# Patient Record
Sex: FEMALE | Race: WHITE | Employment: OTHER | URBAN - METROPOLITAN AREA
[De-identification: names, ages, dates, MRNs, and addresses within clinical notes are randomized per-mention and may not be internally consistent; named-entity substitution may affect disease eponyms.]

---

## 2017-05-05 ENCOUNTER — LAB ENCOUNTER (OUTPATIENT)
Dept: LAB | Age: 77
End: 2017-05-05
Attending: OPHTHALMOLOGY
Payer: MEDICARE

## 2017-05-05 DIAGNOSIS — H47.011 ANTERIOR ISCHEMIC OPTIC NEUROPATHY OF RIGHT EYE: Primary | ICD-10-CM

## 2017-05-05 PROCEDURE — 36415 COLL VENOUS BLD VENIPUNCTURE: CPT

## 2017-05-05 PROCEDURE — 80061 LIPID PANEL: CPT

## 2017-05-05 PROCEDURE — 85025 COMPLETE CBC W/AUTO DIFF WBC: CPT

## 2017-05-05 PROCEDURE — 85652 RBC SED RATE AUTOMATED: CPT

## 2017-05-05 PROCEDURE — 86140 C-REACTIVE PROTEIN: CPT

## 2017-05-05 PROCEDURE — 83036 HEMOGLOBIN GLYCOSYLATED A1C: CPT

## 2017-05-09 ENCOUNTER — HOSPITAL ENCOUNTER (OUTPATIENT)
Dept: MRI IMAGING | Age: 77
Discharge: HOME | End: 2017-05-09
Attending: OPHTHALMOLOGY
Payer: MEDICARE

## 2017-05-09 DIAGNOSIS — H47.291 TEMPORAL PALLOR OF OPTIC DISC, RIGHT: ICD-10-CM

## 2017-05-09 DIAGNOSIS — H40.1112 PRIMARY OPEN ANGLE GLAUCOMA OF RIGHT EYE, MODERATE STAGE: ICD-10-CM

## 2017-05-09 PROCEDURE — 70543 MRI ORBT/FAC/NCK W/O &W/DYE: CPT | Performed by: OPHTHALMOLOGY

## 2017-05-09 PROCEDURE — 70553 MRI BRAIN STEM W/O & W/DYE: CPT | Performed by: OPHTHALMOLOGY

## 2017-05-09 PROCEDURE — A9575 INJ GADOTERATE MEGLUMI 0.1ML: HCPCS | Performed by: RADIOLOGY

## 2018-06-21 ENCOUNTER — LAB ENCOUNTER (OUTPATIENT)
Dept: LAB | Age: 78
End: 2018-06-21
Attending: INTERNAL MEDICINE
Payer: MEDICARE

## 2018-06-21 DIAGNOSIS — E78.00 PURE HYPERCHOLESTEROLEMIA: ICD-10-CM

## 2018-06-21 DIAGNOSIS — I10 ESSENTIAL HYPERTENSION, BENIGN: ICD-10-CM

## 2018-06-21 PROCEDURE — 80061 LIPID PANEL: CPT

## 2018-06-21 PROCEDURE — 80053 COMPREHEN METABOLIC PANEL: CPT

## 2018-06-21 PROCEDURE — 36415 COLL VENOUS BLD VENIPUNCTURE: CPT

## 2018-06-22 NOTE — PROGRESS NOTES
Please call  Her labs are normal except her sodium is low  This is most likely due to her diuretic  Recommend increasing water intake to at least 64oz a day  Will recheck bmp in 1 month

## 2018-06-24 NOTE — PROGRESS NOTES
Please clarify the order to increase fluids with a low Na of 130, wouldn't you want to restrict fluids?   Thank you

## 2018-07-08 NOTE — PROGRESS NOTES
Tried calling pt  No answer, phone rings multiple times and then goes to busy signal  Letter sent to [facility] pool for printing and mailing

## 2018-08-21 ENCOUNTER — LAB ENCOUNTER (OUTPATIENT)
Dept: LAB | Age: 78
End: 2018-08-21
Attending: INTERNAL MEDICINE
Payer: MEDICARE

## 2018-08-21 DIAGNOSIS — E87.1 HYPONATREMIA: ICD-10-CM

## 2018-08-21 LAB
ANION GAP SERPL CALC-SCNC: 5 MMOL/L (ref 0–18)
BUN BLD-MCNC: 11 MG/DL (ref 8–20)
BUN/CREAT SERPL: 13.4 (ref 10–20)
CALCIUM BLD-MCNC: 9.5 MG/DL (ref 8.3–10.3)
CHLORIDE SERPL-SCNC: 99 MMOL/L (ref 101–111)
CO2 SERPL-SCNC: 31 MMOL/L (ref 22–32)
CREAT BLD-MCNC: 0.82 MG/DL (ref 0.55–1.02)
GLUCOSE BLD-MCNC: 94 MG/DL (ref 70–99)
OSMOLALITY SERPL CALC.SUM OF ELEC: 279 MOSM/KG (ref 275–295)
POTASSIUM SERPL-SCNC: 4.1 MMOL/L (ref 3.6–5.1)
SODIUM SERPL-SCNC: 135 MMOL/L (ref 136–144)

## 2018-08-21 PROCEDURE — 36415 COLL VENOUS BLD VENIPUNCTURE: CPT

## 2018-08-21 PROCEDURE — 80048 BASIC METABOLIC PNL TOTAL CA: CPT

## 2019-09-05 ENCOUNTER — HOSPITAL ENCOUNTER (OUTPATIENT)
Dept: BONE DENSITY | Age: 79
Discharge: HOME | End: 2019-09-05
Attending: INTERNAL MEDICINE
Payer: MEDICARE

## 2019-09-05 ENCOUNTER — LAB ENCOUNTER (OUTPATIENT)
Dept: LAB | Age: 79
End: 2019-09-05
Attending: INTERNAL MEDICINE
Payer: MEDICARE

## 2019-09-05 DIAGNOSIS — I10 ESSENTIAL HYPERTENSION, BENIGN: ICD-10-CM

## 2019-09-05 DIAGNOSIS — Z78.9 VEGETARIAN: ICD-10-CM

## 2019-09-05 DIAGNOSIS — E78.00 PURE HYPERCHOLESTEROLEMIA: ICD-10-CM

## 2019-09-05 DIAGNOSIS — Z78.0 MENOPAUSE: ICD-10-CM

## 2019-09-05 LAB
ALBUMIN SERPL-MCNC: 4 G/DL (ref 3.4–5)
ALBUMIN/GLOB SERPL: 1.1 {RATIO} (ref 1–2)
ALP LIVER SERPL-CCNC: 99 U/L (ref 55–142)
ALT SERPL-CCNC: 25 U/L (ref 13–56)
ANION GAP SERPL CALC-SCNC: 8 MMOL/L (ref 0–18)
AST SERPL-CCNC: 20 U/L (ref 15–37)
BILIRUB SERPL-MCNC: 0.6 MG/DL (ref 0.1–2)
BUN BLD-MCNC: 11 MG/DL (ref 7–18)
BUN/CREAT SERPL: 13.9 (ref 10–20)
CALCIUM BLD-MCNC: 8.9 MG/DL (ref 8.5–10.1)
CHLORIDE SERPL-SCNC: 100 MMOL/L (ref 98–112)
CO2 SERPL-SCNC: 27 MMOL/L (ref 21–32)
CREAT BLD-MCNC: 0.79 MG/DL (ref 0.55–1.02)
FOLATE SERPL-MCNC: 20 NG/ML (ref 8.7–?)
GLOBULIN PLAS-MCNC: 3.7 G/DL (ref 2.8–4.4)
GLUCOSE BLD-MCNC: 103 MG/DL (ref 70–99)
M PROTEIN MFR SERPL ELPH: 7.7 G/DL (ref 6.4–8.2)
OSMOLALITY SERPL CALC.SUM OF ELEC: 280 MOSM/KG (ref 275–295)
POTASSIUM SERPL-SCNC: 4.7 MMOL/L (ref 3.5–5.1)
SODIUM SERPL-SCNC: 135 MMOL/L (ref 136–145)
VIT B12 SERPL-MCNC: 468 PG/ML (ref 193–986)

## 2019-09-05 PROCEDURE — 82746 ASSAY OF FOLIC ACID SERUM: CPT

## 2019-09-05 PROCEDURE — 80053 COMPREHEN METABOLIC PANEL: CPT

## 2019-09-05 PROCEDURE — 77080 DXA BONE DENSITY AXIAL: CPT | Performed by: INTERNAL MEDICINE

## 2019-09-05 PROCEDURE — 82607 VITAMIN B-12: CPT

## 2020-11-09 ENCOUNTER — NURSE ONLY (OUTPATIENT)
Dept: FAMILY MEDICINE CLINIC | Facility: CLINIC | Age: 80
End: 2020-11-09
Payer: MEDICARE

## 2020-11-09 DIAGNOSIS — E78.00 PURE HYPERCHOLESTEROLEMIA: ICD-10-CM

## 2020-11-09 DIAGNOSIS — I10 ESSENTIAL HYPERTENSION, BENIGN: ICD-10-CM

## 2020-11-09 PROCEDURE — 80053 COMPREHEN METABOLIC PANEL: CPT | Performed by: INTERNAL MEDICINE

## 2020-11-09 PROCEDURE — 80061 LIPID PANEL: CPT | Performed by: INTERNAL MEDICINE

## 2020-11-09 NOTE — PROGRESS NOTES
Sherry Rosa presents today for nurse visit. 1 light green drawn from right  ac area with 1 attempt with straight needle. Patient tolerated well. Left office in stable condition.

## 2021-09-14 ENCOUNTER — LAB ENCOUNTER (OUTPATIENT)
Dept: LAB | Age: 81
End: 2021-09-14
Attending: INTERNAL MEDICINE
Payer: MEDICARE

## 2021-09-14 DIAGNOSIS — E87.1 LOW SODIUM LEVELS: ICD-10-CM

## 2021-09-14 LAB
ANION GAP SERPL CALC-SCNC: 6 MMOL/L (ref 0–18)
BUN BLD-MCNC: 12 MG/DL (ref 7–18)
CALCIUM BLD-MCNC: 8.9 MG/DL (ref 8.5–10.1)
CHLORIDE SERPL-SCNC: 97 MMOL/L (ref 98–112)
CO2 SERPL-SCNC: 28 MMOL/L (ref 21–32)
CREAT BLD-MCNC: 0.68 MG/DL
GLUCOSE BLD-MCNC: 90 MG/DL (ref 70–99)
OSMOLALITY SERPL CALC.SUM OF ELEC: 271 MOSM/KG (ref 275–295)
PATIENT FASTING Y/N/NP: NO
POTASSIUM SERPL-SCNC: 3.7 MMOL/L (ref 3.5–5.1)
SODIUM SERPL-SCNC: 131 MMOL/L (ref 136–145)

## 2021-09-14 PROCEDURE — 80048 BASIC METABOLIC PNL TOTAL CA: CPT

## 2021-09-14 PROCEDURE — 36415 COLL VENOUS BLD VENIPUNCTURE: CPT

## 2021-09-21 NOTE — PROGRESS NOTES
[employer]          [phone]  [place] #[facility]   Letter sent 9/21/2021  Via mail with MD comment below. Lab ordered.

## 2021-09-29 ENCOUNTER — LAB ENCOUNTER (OUTPATIENT)
Dept: LAB | Age: 81
End: 2021-09-29
Attending: INTERNAL MEDICINE
Payer: MEDICARE

## 2021-09-29 DIAGNOSIS — E87.1 HYPONATREMIA: ICD-10-CM

## 2021-09-29 PROCEDURE — 36415 COLL VENOUS BLD VENIPUNCTURE: CPT

## 2021-09-29 PROCEDURE — 80048 BASIC METABOLIC PNL TOTAL CA: CPT

## 2023-03-27 ENCOUNTER — LAB ENCOUNTER (OUTPATIENT)
Dept: LAB | Age: 83
End: 2023-03-27
Attending: INTERNAL MEDICINE
Payer: MEDICARE

## 2023-03-27 DIAGNOSIS — E87.1 HYPONATREMIA: Primary | ICD-10-CM

## 2023-03-27 LAB
ANION GAP SERPL CALC-SCNC: 4 MMOL/L (ref 0–18)
BUN BLD-MCNC: 12 MG/DL (ref 7–18)
CALCIUM BLD-MCNC: 9 MG/DL (ref 8.5–10.1)
CHLORIDE SERPL-SCNC: 99 MMOL/L (ref 98–112)
CO2 SERPL-SCNC: 29 MMOL/L (ref 21–32)
CREAT BLD-MCNC: 0.83 MG/DL
FASTING STATUS PATIENT QL REPORTED: YES
GFR SERPLBLD BASED ON 1.73 SQ M-ARVRAT: 69 ML/MIN/1.73M2 (ref 60–?)
GLUCOSE BLD-MCNC: 109 MG/DL (ref 70–99)
OSMOLALITY SERPL CALC.SUM OF ELEC: 274 MOSM/KG (ref 275–295)
POTASSIUM SERPL-SCNC: 3.6 MMOL/L (ref 3.5–5.1)
SODIUM SERPL-SCNC: 132 MMOL/L (ref 136–145)

## 2023-03-27 PROCEDURE — 36415 COLL VENOUS BLD VENIPUNCTURE: CPT

## 2023-03-27 PROCEDURE — 80048 BASIC METABOLIC PNL TOTAL CA: CPT

## 2023-05-09 ENCOUNTER — LAB ENCOUNTER (OUTPATIENT)
Dept: LAB | Age: 83
End: 2023-05-09
Attending: INTERNAL MEDICINE
Payer: MEDICARE

## 2023-05-09 DIAGNOSIS — E87.1 HYPONATREMIA: Primary | ICD-10-CM

## 2023-05-09 LAB
ANION GAP SERPL CALC-SCNC: 2 MMOL/L (ref 0–18)
BUN BLD-MCNC: 14 MG/DL (ref 7–18)
CALCIUM BLD-MCNC: 9.1 MG/DL (ref 8.5–10.1)
CHLORIDE SERPL-SCNC: 98 MMOL/L (ref 98–112)
CO2 SERPL-SCNC: 29 MMOL/L (ref 21–32)
CREAT BLD-MCNC: 0.83 MG/DL
FASTING STATUS PATIENT QL REPORTED: YES
GFR SERPLBLD BASED ON 1.73 SQ M-ARVRAT: 69 ML/MIN/1.73M2 (ref 60–?)
GLUCOSE BLD-MCNC: 99 MG/DL (ref 70–99)
OSMOLALITY SERPL CALC.SUM OF ELEC: 269 MOSM/KG (ref 275–295)
POTASSIUM SERPL-SCNC: 4 MMOL/L (ref 3.5–5.1)
SODIUM SERPL-SCNC: 129 MMOL/L (ref 136–145)

## 2023-05-09 PROCEDURE — 36415 COLL VENOUS BLD VENIPUNCTURE: CPT

## 2023-05-09 PROCEDURE — 80048 BASIC METABOLIC PNL TOTAL CA: CPT

## 2023-07-20 ENCOUNTER — LAB ENCOUNTER (OUTPATIENT)
Dept: LAB | Age: 83
End: 2023-07-20
Attending: INTERNAL MEDICINE
Payer: MEDICARE

## 2023-07-20 DIAGNOSIS — E87.1 LOW SODIUM LEVELS: Primary | ICD-10-CM

## 2023-07-20 LAB
ANION GAP SERPL CALC-SCNC: 7 MMOL/L (ref 0–18)
BUN BLD-MCNC: 20 MG/DL (ref 7–18)
CALCIUM BLD-MCNC: 9 MG/DL (ref 8.5–10.1)
CHLORIDE SERPL-SCNC: 99 MMOL/L (ref 98–112)
CO2 SERPL-SCNC: 27 MMOL/L (ref 21–32)
CREAT BLD-MCNC: 0.79 MG/DL
FASTING STATUS PATIENT QL REPORTED: NO
GFR SERPLBLD BASED ON 1.73 SQ M-ARVRAT: 74 ML/MIN/1.73M2 (ref 60–?)
GLUCOSE BLD-MCNC: 93 MG/DL (ref 70–99)
OSMOLALITY SERPL CALC.SUM OF ELEC: 278 MOSM/KG (ref 275–295)
POTASSIUM SERPL-SCNC: 4 MMOL/L (ref 3.5–5.1)
SODIUM SERPL-SCNC: 133 MMOL/L (ref 136–145)

## 2023-07-20 PROCEDURE — 36415 COLL VENOUS BLD VENIPUNCTURE: CPT

## 2023-07-20 PROCEDURE — 80048 BASIC METABOLIC PNL TOTAL CA: CPT

## 2024-12-29 ENCOUNTER — HOSPITAL ENCOUNTER (OUTPATIENT)
Age: 84
Discharge: HOME | End: 2024-12-29
Payer: MEDICARE

## 2024-12-29 VITALS
OXYGEN SATURATION: 98 % | BODY MASS INDEX: 25.2 KG/M2 | DIASTOLIC BLOOD PRESSURE: 73 MMHG | HEIGHT: 59 IN | WEIGHT: 125 LBS | SYSTOLIC BLOOD PRESSURE: 174 MMHG | TEMPERATURE: 98 F | HEART RATE: 80 BPM | RESPIRATION RATE: 16 BRPM

## 2024-12-29 DIAGNOSIS — J02.9 VIRAL PHARYNGITIS: Primary | ICD-10-CM

## 2024-12-29 LAB — S PYO AG THROAT QL: NEGATIVE

## 2024-12-29 PROCEDURE — 87880 STREP A ASSAY W/OPTIC: CPT | Performed by: NURSE PRACTITIONER

## 2024-12-29 PROCEDURE — 99203 OFFICE O/P NEW LOW 30 MIN: CPT | Performed by: NURSE PRACTITIONER

## 2024-12-29 NOTE — DISCHARGE INSTRUCTIONS
Please purchase over-the-counter Mucinex Insta soothe throat lozenges.  You could also take Tylenol for pain.  Warm salt water gargles and spitting.  Stay home when you feel unwell.  PCP follow-up as needed.

## 2024-12-29 NOTE — ED PROVIDER NOTES
Patient Seen in: [facility]      History     Chief Complaint   Patient presents with    Sore Throat     Stated Complaint: sore throat    Subjective:   This is an 84-year-old with a history of hyperlipidemia and hypertension.  Presents to immediate care for localized sore throat.  Symptoms going for 3 days.  She was exposed to her grandchildren which are sick with viral illnesses.  No voice change or stridor.  No fevers.  No difficulty breathing or wheezing.  No chest pain or shortness of breath.  No treatment attempted prior to arrival.    The history is provided by the patient.             Objective:     Past Medical History:    HYPERLIPIDEMIA    HYPERTENSION              Past Surgical History:   Procedure Laterality Date    Hysterectomy      complete/excessive bleeding                Social History     Socioeconomic History    Marital status:     Number of children: 5   Occupational History    Occupation: retired     Comment: housewife   Tobacco Use    Smoking status: Never    Smokeless tobacco: Never   Substance and Sexual Activity    Alcohol use: No    Drug use: No    Sexual activity: Yes     Partners: Male              Review of Systems   Constitutional:  Negative for chills and fever.   HENT:  Positive for sore throat. Negative for congestion.    Respiratory:  Positive for cough. Negative for shortness of breath, wheezing and stridor.    Cardiovascular:  Negative for chest pain and leg swelling.   Gastrointestinal:  Negative for abdominal pain, constipation, diarrhea, nausea and vomiting.   Genitourinary:  Negative for dysuria.   Musculoskeletal:  Negative for back pain, neck pain and neck stiffness.   Skin:  Negative for rash.   Neurological:  Negative for headaches.       Positive for stated complaint: sore throat  Other systems are as noted in HPI.  Constitutional and vital signs reviewed.      All other systems reviewed and negative except as noted above.    Physical Exam     ED Triage  Vitals [12/29/24 0808]   BP (!) 174/73   Pulse 80   Resp 16   Temp 98.3 °F (36.8 °C)   Temp src Oral   SpO2 98 %   O2 Device None (Room air)       Current Vitals:   Vital Signs  BP: (!) 174/73  Pulse: 80  Resp: 16  Temp: 98.3 °F (36.8 °C)  Temp src: Oral    Oxygen Therapy  SpO2: 98 %  O2 Device: None (Room air)        Physical Exam  Vitals and nursing note reviewed.   Constitutional:       General: She is not in acute distress.     Appearance: Normal appearance. She is well-developed and normal weight. She is not ill-appearing, toxic-appearing or diaphoretic.   HENT:      Head: Normocephalic and atraumatic.      Right Ear: Tympanic membrane, ear canal and external ear normal. No drainage, swelling or tenderness. No middle ear effusion. Tympanic membrane is not erythematous.      Left Ear: Tympanic membrane, ear canal and external ear normal. No drainage, swelling or tenderness.  No middle ear effusion. Tympanic membrane is not erythematous.      Nose: Nose normal. No congestion or rhinorrhea.      Mouth/Throat:      Mouth: Mucous membranes are moist. No oral lesions.      Pharynx: Oropharynx is clear. Uvula midline. Posterior oropharyngeal erythema present. No pharyngeal swelling, oropharyngeal exudate or uvula swelling.      Tonsils: No tonsillar exudate or tonsillar abscesses. 0 on the right. 0 on the left.   Eyes:      General:         Right eye: No discharge.         Left eye: No discharge.      Extraocular Movements: Extraocular movements intact.      Conjunctiva/sclera: Conjunctivae normal.   Cardiovascular:      Rate and Rhythm: Normal rate and regular rhythm.      Heart sounds: Normal heart sounds. No murmur heard.  Pulmonary:      Effort: Pulmonary effort is normal. No respiratory distress.      Breath sounds: Normal breath sounds. No stridor. No wheezing, rhonchi or rales.   Musculoskeletal:      Cervical back: Neck supple.      Right lower leg: No edema.      Left lower leg: No edema.   Skin:     General:  Skin is warm and dry.      Capillary Refill: Capillary refill takes less than 2 seconds.      Findings: No rash.   Neurological:      Mental Status: She is alert and oriented to person, place, and time.   Psychiatric:         Mood and Affect: Mood normal.         Behavior: Behavior normal.             ED Course     Labs Reviewed   POCT RAPID STREP - Normal   GRP A STREP CULT, THROAT            Rapid strep       MDM        Vital signs stable.  Patient is well-appearing and nontoxic looking.  Presents to immediate care for localized sore throat.    Differential diagnosis includes was not limited to viral pharyngitis, strep pharyngitis, COVID, other viral illness, less likely PTA    Posterior pharynx is mildly erythemic with no evidence of exudates or PTA.  Rapid strep is negative.  Patient was offered COVID and flu testing.  She declined.    Clinical impression is viral pharyngitis.    DC home.  Symptomatic and supportive care discussed.  Recommended PCP follow-up as needed.  Reasons to return reviewed.  She verbalized understanding, and agreed with plan of care.  All questions were answered.      Medical Decision Making      Disposition and Plan     Clinical Impression:  1. Viral pharyngitis         Disposition:  Discharge  12/29/2024  8:22 am    Follow-up:  Dana Long MD  [address] [facility]  [address] [place] [id number]  [phone]      As needed          Medications Prescribed:  Current Discharge Medication List              Supplementary Documentation:

## 2025-01-01 ENCOUNTER — HOSPITAL ENCOUNTER (INPATIENT)
Facility: HOSPITAL | Age: 85
LOS: 1 days | Discharge: HOME | End: 2025-01-02
Attending: EMERGENCY MEDICINE | Admitting: HOSPITALIST
Payer: MEDICARE

## 2025-01-01 ENCOUNTER — APPOINTMENT (OUTPATIENT)
Dept: GENERAL RADIOLOGY | Facility: HOSPITAL | Age: 85
End: 2025-01-01
Payer: MEDICARE

## 2025-01-01 DIAGNOSIS — U07.1 COVID-19: ICD-10-CM

## 2025-01-01 DIAGNOSIS — E87.1 HYPONATREMIA: ICD-10-CM

## 2025-01-01 DIAGNOSIS — I48.91 ATRIAL FIBRILLATION, NEW ONSET (HCC): Primary | ICD-10-CM

## 2025-01-01 PROBLEM — E87.6 HYPOKALEMIA: Status: ACTIVE | Noted: 2025-01-01

## 2025-01-01 PROBLEM — D69.6 THROMBOCYTOPENIA: Status: ACTIVE | Noted: 2025-01-01

## 2025-01-01 PROBLEM — R73.9 HYPERGLYCEMIA: Status: ACTIVE | Noted: 2025-01-01

## 2025-01-01 PROBLEM — D69.6 THROMBOCYTOPENIA (HCC): Status: ACTIVE | Noted: 2025-01-01

## 2025-01-01 LAB
ALBUMIN SERPL-MCNC: 4.3 G/DL (ref 3.2–4.8)
ALBUMIN/GLOB SERPL: 1.5 {RATIO} (ref 1–2)
ALP LIVER SERPL-CCNC: 85 U/L
ALT SERPL-CCNC: 18 U/L
ANION GAP SERPL CALC-SCNC: 9 MMOL/L (ref 0–18)
APTT PPP: 29.2 SECONDS (ref 23–36)
AST SERPL-CCNC: 28 U/L (ref ?–34)
BASOPHILS # BLD AUTO: 0 X10(3) UL (ref 0–0.2)
BASOPHILS NFR BLD AUTO: 0 %
BILIRUB SERPL-MCNC: 0.6 MG/DL (ref 0.2–1.1)
BUN BLD-MCNC: 10 MG/DL (ref 9–23)
CALCIUM BLD-MCNC: 9 MG/DL (ref 8.7–10.4)
CHLORIDE SERPL-SCNC: 94 MMOL/L (ref 98–112)
CO2 SERPL-SCNC: 26 MMOL/L (ref 21–32)
CREAT BLD-MCNC: 0.8 MG/DL
EGFRCR SERPLBLD CKD-EPI 2021: 72 ML/MIN/1.73M2 (ref 60–?)
EOSINOPHIL # BLD AUTO: 0.01 X10(3) UL (ref 0–0.7)
EOSINOPHIL NFR BLD AUTO: 0.3 %
ERYTHROCYTE [DISTWIDTH] IN BLOOD BY AUTOMATED COUNT: 11.8 %
FLUAV + FLUBV RNA SPEC NAA+PROBE: NEGATIVE
FLUAV + FLUBV RNA SPEC NAA+PROBE: NEGATIVE
GLOBULIN PLAS-MCNC: 2.9 G/DL (ref 2–3.5)
GLUCOSE BLD-MCNC: 111 MG/DL (ref 70–99)
HCT VFR BLD AUTO: 38.3 %
HGB BLD-MCNC: 13.5 G/DL
IMM GRANULOCYTES # BLD AUTO: 0 X10(3) UL (ref 0–1)
IMM GRANULOCYTES NFR BLD: 0 %
INR BLD: 1.03 (ref 0.8–1.2)
LYMPHOCYTES # BLD AUTO: 0.79 X10(3) UL (ref 1–4)
LYMPHOCYTES NFR BLD AUTO: 27.5 %
MCH RBC QN AUTO: 30.5 PG (ref 26–34)
MCHC RBC AUTO-ENTMCNC: 35.2 G/DL (ref 31–37)
MCV RBC AUTO: 86.7 FL
MONOCYTES # BLD AUTO: 0.75 X10(3) UL (ref 0.1–1)
MONOCYTES NFR BLD AUTO: 26.1 %
NEUTROPHILS # BLD AUTO: 1.32 X10 (3) UL (ref 1.5–7.7)
NEUTROPHILS # BLD AUTO: 1.32 X10(3) UL (ref 1.5–7.7)
NEUTROPHILS NFR BLD AUTO: 46.1 %
OSMOLALITY SERPL CALC.SUM OF ELEC: 268 MOSM/KG (ref 275–295)
PLATELET # BLD AUTO: 115 10(3)UL (ref 150–450)
PLATELETS.RETICULATED NFR BLD AUTO: 6.2 % (ref 0–7)
POTASSIUM SERPL-SCNC: 3.5 MMOL/L (ref 3.5–5.1)
PROT SERPL-MCNC: 7.2 G/DL (ref 5.7–8.2)
PROTHROMBIN TIME: 13.6 SECONDS (ref 11.6–14.8)
RBC # BLD AUTO: 4.42 X10(6)UL
RSV RNA SPEC NAA+PROBE: NEGATIVE
SARS-COV-2 RNA RESP QL NAA+PROBE: DETECTED
SODIUM SERPL-SCNC: 129 MMOL/L (ref 136–145)
TROPONIN I SERPL HS-MCNC: 10 NG/L
WBC # BLD AUTO: 2.9 X10(3) UL (ref 4–11)

## 2025-01-01 PROCEDURE — 84484 ASSAY OF TROPONIN QUANT: CPT | Performed by: EMERGENCY MEDICINE

## 2025-01-01 PROCEDURE — 80053 COMPREHEN METABOLIC PANEL: CPT | Performed by: EMERGENCY MEDICINE

## 2025-01-01 PROCEDURE — 96361 HYDRATE IV INFUSION ADD-ON: CPT

## 2025-01-01 PROCEDURE — 85730 THROMBOPLASTIN TIME PARTIAL: CPT | Performed by: EMERGENCY MEDICINE

## 2025-01-01 PROCEDURE — 93005 ELECTROCARDIOGRAM TRACING: CPT

## 2025-01-01 PROCEDURE — 99285 EMERGENCY DEPT VISIT HI MDM: CPT

## 2025-01-01 PROCEDURE — 71045 X-RAY EXAM CHEST 1 VIEW: CPT

## 2025-01-01 PROCEDURE — 96375 TX/PRO/DX INJ NEW DRUG ADDON: CPT

## 2025-01-01 PROCEDURE — 93010 ELECTROCARDIOGRAM REPORT: CPT

## 2025-01-01 PROCEDURE — 85610 PROTHROMBIN TIME: CPT | Performed by: EMERGENCY MEDICINE

## 2025-01-01 PROCEDURE — 85025 COMPLETE CBC W/AUTO DIFF WBC: CPT | Performed by: EMERGENCY MEDICINE

## 2025-01-01 PROCEDURE — 96374 THER/PROPH/DIAG INJ IV PUSH: CPT

## 2025-01-01 PROCEDURE — 0241U SARS-COV-2/FLU A AND B/RSV BY PCR (GENEXPERT): CPT | Performed by: EMERGENCY MEDICINE

## 2025-01-01 RX ORDER — AZITHROMYCIN 250 MG/1
TABLET, FILM COATED ORAL
COMMUNITY
Start: 2024-12-30 | End: 2025-01-02

## 2025-01-01 RX ORDER — ASPIRIN 81 MG/1
81 TABLET ORAL NIGHTLY
COMMUNITY
End: 2025-01-02

## 2025-01-01 RX ORDER — ONDANSETRON 2 MG/ML
4 INJECTION INTRAMUSCULAR; INTRAVENOUS EVERY 6 HOURS PRN
Status: DISCONTINUED | OUTPATIENT
Start: 2025-01-01 | End: 2025-01-02

## 2025-01-01 RX ORDER — ASPIRIN 81 MG/1
81 TABLET ORAL NIGHTLY
Status: DISCONTINUED | OUTPATIENT
Start: 2025-01-01 | End: 2025-01-02

## 2025-01-01 RX ORDER — SENNOSIDES 8.6 MG
17.2 TABLET ORAL NIGHTLY PRN
Status: DISCONTINUED | OUTPATIENT
Start: 2025-01-01 | End: 2025-01-02

## 2025-01-01 RX ORDER — ACETAMINOPHEN 500 MG
1000 TABLET ORAL EVERY 6 HOURS PRN
Status: DISCONTINUED | OUTPATIENT
Start: 2025-01-01 | End: 2025-01-02

## 2025-01-01 RX ORDER — ENOXAPARIN SODIUM 100 MG/ML
60 INJECTION SUBCUTANEOUS ONCE
Status: DISCONTINUED | OUTPATIENT
Start: 2025-01-01 | End: 2025-01-02

## 2025-01-01 RX ORDER — SODIUM CHLORIDE 9 MG/ML
INJECTION, SOLUTION INTRAVENOUS CONTINUOUS
Status: ACTIVE | OUTPATIENT
Start: 2025-01-01 | End: 2025-01-02

## 2025-01-01 RX ORDER — LIDOCAINE HYDROCHLORIDE 20 MG/ML
SOLUTION OROPHARYNGEAL
COMMUNITY

## 2025-01-01 RX ORDER — MAGNESIUM SULFATE 1 G/100ML
1 INJECTION INTRAVENOUS ONCE
Status: COMPLETED | OUTPATIENT
Start: 2025-01-01 | End: 2025-01-01

## 2025-01-01 RX ORDER — BISACODYL 10 MG
10 SUPPOSITORY, RECTAL RECTAL
Status: DISCONTINUED | OUTPATIENT
Start: 2025-01-01 | End: 2025-01-02

## 2025-01-01 RX ORDER — ATORVASTATIN CALCIUM 10 MG/1
10 TABLET, FILM COATED ORAL NIGHTLY
Status: DISCONTINUED | OUTPATIENT
Start: 2025-01-01 | End: 2025-01-02

## 2025-01-01 RX ORDER — POLYETHYLENE GLYCOL 3350 17 G/17G
17 POWDER, FOR SOLUTION ORAL DAILY PRN
Status: DISCONTINUED | OUTPATIENT
Start: 2025-01-01 | End: 2025-01-02

## 2025-01-01 RX ORDER — POTASSIUM CHLORIDE 1500 MG/1
40 TABLET, EXTENDED RELEASE ORAL ONCE
Status: COMPLETED | OUTPATIENT
Start: 2025-01-01 | End: 2025-01-01

## 2025-01-01 RX ORDER — DILTIAZEM HYDROCHLORIDE 30 MG/1
30 TABLET, FILM COATED ORAL EVERY 6 HOURS SCHEDULED
Status: DISCONTINUED | OUTPATIENT
Start: 2025-01-02 | End: 2025-01-02

## 2025-01-01 RX ORDER — METOPROLOL SUCCINATE 100 MG/1
100 TABLET, EXTENDED RELEASE ORAL NIGHTLY
Status: DISCONTINUED | OUTPATIENT
Start: 2025-01-01 | End: 2025-01-02

## 2025-01-01 NOTE — ED INITIAL ASSESSMENT (HPI)
Pt was dx with pharyngitis on 12/29/24.  Pt states feels she can hardly speak, states it is hard.  Pt states it is hard to breath when laying down.  Pt states has been on abx since monday

## 2025-01-02 ENCOUNTER — APPOINTMENT (OUTPATIENT)
Dept: CV DIAGNOSTICS | Facility: HOSPITAL | Age: 85
End: 2025-01-02
Attending: HOSPITALIST
Payer: MEDICARE

## 2025-01-02 VITALS
TEMPERATURE: 99 F | SYSTOLIC BLOOD PRESSURE: 136 MMHG | HEIGHT: 59 IN | RESPIRATION RATE: 20 BRPM | BODY MASS INDEX: 23.46 KG/M2 | HEART RATE: 66 BPM | OXYGEN SATURATION: 96 % | DIASTOLIC BLOOD PRESSURE: 65 MMHG | WEIGHT: 116.38 LBS

## 2025-01-02 LAB
ALBUMIN SERPL-MCNC: 3.9 G/DL (ref 3.2–4.8)
ALBUMIN/GLOB SERPL: 1.6 {RATIO} (ref 1–2)
ALP LIVER SERPL-CCNC: 74 U/L
ALT SERPL-CCNC: 16 U/L
ANION GAP SERPL CALC-SCNC: 10 MMOL/L (ref 0–18)
AST SERPL-CCNC: 23 U/L (ref ?–34)
BASOPHILS # BLD AUTO: 0 X10(3) UL (ref 0–0.2)
BASOPHILS NFR BLD AUTO: 0 %
BILIRUB SERPL-MCNC: 0.4 MG/DL (ref 0.2–1.1)
BUN BLD-MCNC: 7 MG/DL (ref 9–23)
CALCIUM BLD-MCNC: 8.3 MG/DL (ref 8.7–10.4)
CHLORIDE SERPL-SCNC: 97 MMOL/L (ref 98–112)
CO2 SERPL-SCNC: 22 MMOL/L (ref 21–32)
CREAT BLD-MCNC: 0.74 MG/DL
EGFRCR SERPLBLD CKD-EPI 2021: 79 ML/MIN/1.73M2 (ref 60–?)
EOSINOPHIL # BLD AUTO: 0.01 X10(3) UL (ref 0–0.7)
EOSINOPHIL NFR BLD AUTO: 0.4 %
ERYTHROCYTE [DISTWIDTH] IN BLOOD BY AUTOMATED COUNT: 11.9 %
GLOBULIN PLAS-MCNC: 2.4 G/DL (ref 2–3.5)
GLUCOSE BLD-MCNC: 123 MG/DL (ref 70–99)
HCT VFR BLD AUTO: 34.9 %
HGB BLD-MCNC: 11.8 G/DL
IMM GRANULOCYTES # BLD AUTO: 0 X10(3) UL (ref 0–1)
IMM GRANULOCYTES NFR BLD: 0 %
LYMPHOCYTES # BLD AUTO: 0.76 X10(3) UL (ref 1–4)
LYMPHOCYTES NFR BLD AUTO: 31.5 %
MAGNESIUM SERPL-MCNC: 2.2 MG/DL (ref 1.6–2.6)
MCH RBC QN AUTO: 30.8 PG (ref 26–34)
MCHC RBC AUTO-ENTMCNC: 33.8 G/DL (ref 31–37)
MCV RBC AUTO: 91.1 FL
MONOCYTES # BLD AUTO: 0.59 X10(3) UL (ref 0.1–1)
MONOCYTES NFR BLD AUTO: 24.5 %
NEUTROPHILS # BLD AUTO: 1.05 X10 (3) UL (ref 1.5–7.7)
NEUTROPHILS # BLD AUTO: 1.05 X10(3) UL (ref 1.5–7.7)
NEUTROPHILS NFR BLD AUTO: 43.6 %
OSMOLALITY SERPL CALC.SUM OF ELEC: 267 MOSM/KG (ref 275–295)
PLATELET # BLD AUTO: 127 10(3)UL (ref 150–450)
PLATELETS.RETICULATED NFR BLD AUTO: 6.6 % (ref 0–7)
POTASSIUM SERPL-SCNC: 4.2 MMOL/L (ref 3.5–5.1)
PROT SERPL-MCNC: 6.3 G/DL (ref 5.7–8.2)
Q-T INTERVAL: 352 MS
QRS DURATION: 80 MS
QTC CALCULATION (BEZET): 513 MS
R AXIS: 40 DEGREES
RBC # BLD AUTO: 3.83 X10(6)UL
SODIUM SERPL-SCNC: 129 MMOL/L (ref 136–145)
T AXIS: 127 DEGREES
VENTRICULAR RATE: 128 BPM
WBC # BLD AUTO: 2.4 X10(3) UL (ref 4–11)

## 2025-01-02 PROCEDURE — 93306 TTE W/DOPPLER COMPLETE: CPT | Performed by: HOSPITALIST

## 2025-01-02 PROCEDURE — 80053 COMPREHEN METABOLIC PANEL: CPT | Performed by: HOSPITALIST

## 2025-01-02 PROCEDURE — 85025 COMPLETE CBC W/AUTO DIFF WBC: CPT | Performed by: HOSPITALIST

## 2025-01-02 PROCEDURE — 83735 ASSAY OF MAGNESIUM: CPT | Performed by: HOSPITALIST

## 2025-01-02 PROCEDURE — 93010 ELECTROCARDIOGRAM REPORT: CPT | Performed by: INTERNAL MEDICINE

## 2025-01-02 PROCEDURE — 93005 ELECTROCARDIOGRAM TRACING: CPT

## 2025-01-02 NOTE — CONSULTS
[facility]  Consultation Note      Susan Atkinson Patient Status:  Inpatient    1940 MRN [id number]   Location [facility] 8NE-A Attending Rashel Alva MD   Hosp Day # 1 PCP Dana Long MD     Impression:  1. AF RVR, new diagnosis  2. sore throat--> viral pharyngitis, +COVID    Recommendations:  - AF: discussed diagnosis and likely trigger being COVID related SIRS. Reviewed diagnosis and CVA risk.  Also reviewed treatment options and role for anticoagulation to mitigate CVA risk.  Rates controlled; continue home dose metoprolol. Will start apixaban 2.5mg po bid.  f/u TTE.  If unremarkable, ok for dc from cv standpoint and f/u in office 2-4 weeks.          History of Present Illness:  Susan Atkinson is a 84 year old female who presented to [facility] on 2025.    See in cardiology consultation for AF RVR, new diagnosis.  85 YO F in good health, who presented to the ER with sore throat and SOB.  Earlier seen at immediate care, instructed to go to ER if symptoms persistent/worse. ER work-up notable for +COVID PCR, ECG/telemetry- AF RVR, self converted to SR. This AM, she feels well and hopes to go home.  Denies CP/SOB/palpitations.  on RA.      Medications:  Current Facility-Administered Medications   Medication Dose Route Frequency    enoxaparin (Lovenox) 60 MG/0.6ML SUBQ injection 60 mg  60 mg Subcutaneous Once    aspirin DR tab 81 mg  81 mg Oral Nightly    metoprolol succinate ER (Toprol XL) 24 hr tab 100 mg  100 mg Oral Nightly    atorvastatin (Lipitor) tab 10 mg  10 mg Oral Nightly    dilTIAZem 10 mg BOLUS FROM BAG infusion  10 mg Intravenous Q1H PRN    dilTIAZem (cardIZEM) 100 mg in sodium chloride 0.9% 100 mL IVPB-ADDV  2.5-20 mg/hr Intravenous Continuous    dilTIAZem (cardIZEM) tab 30 mg  30 mg Oral 4 times per day    sodium chloride 0.9% infusion   Intravenous Continuous    acetaminophen (Tylenol Extra Strength) tab 1,000 mg  1,000 mg Oral Q6H PRN     melatonin tab 3 mg  3 mg Oral Nightly PRN    polyethylene glycol (PEG 3350) (Miralax) 17 g oral packet 17 g  17 g Oral Daily PRN    sennosides (Senokot) tab 17.2 mg  17.2 mg Oral Nightly PRN    bisacodyl (Dulcolax) 10 MG rectal suppository 10 mg  10 mg Rectal Daily PRN    ondansetron (Zofran) 4 MG/2ML injection 4 mg  4 mg Intravenous Q6H PRN       Past Medical History:    Arrhythmia    Hearing impaired person, bilateral    High blood pressure    High cholesterol    HYPERLIPIDEMIA    HYPERTENSION    Osteoarthritis    Visual impairment       Past Surgical History:   Procedure Laterality Date    Hysterectomy      complete/excessive bleeding       Family History  family history includes Diabetes in her mother.    Social History   reports that she has never smoked. She has never used smokeless tobacco. She reports that she does not drink alcohol and does not use drugs.     Allergies  Allergies[1]      Review of Systems:  Constitutional: negative for fevers  Eyes: negative for visual disturbance  Ears, nose, mouth, throat, and face: negative for epistaxis  Respiratory: negative for dyspnea on exertion  Cardiovascular: negative for chest pain  Gastrointestinal: negative for melena  Genitourinary:negative for hematuria  Hematologic/lymphatic: negative for bleeding  Musculoskeletal:negative for myalgias  Neurological: negative for dizziness and headaches  Endocrine: negative for temperature intolerance      Physical Exam:  Blood pressure 117/58, pulse 64, temperature 98.4 °F (36.9 °C), temperature source Oral, resp. rate 16, height 4' 11\" (1.499 m), weight 116 lb 6.5 oz (52.8 kg), SpO2 99%, not currently breastfeeding.  Temp (24hrs), Av.1 °F (36.7 °C), Min:97.3 °F (36.3 °C), Max:98.8 °F (37.1 °C)    Wt Readings from Last 3 Encounters:   25 116 lb 6.5 oz (52.8 kg)   24 125 lb (56.7 kg)   22 110 lb 9.6 oz (50.2 kg)       General: Awake and alert; in no acute distress  HEENT: Extraocular movements are  intact; sclerae are anicteric; scalp is atrauamatic; no thyromegaly  Neck: Supple; no JVD; no carotid bruits  Cardiac: Regular rate and regular rhythm; no murmurs/rubs/gallops are appreciated; PMI is non-displaced; there is no evidence of a sternal heave  Lungs: Clear to auscultation bilaterally; no accessory muscle use is noted  Abdomen: Soft, non-tender; bowel sounds are normoactive; no hepatosplenomegaly  Extremities: No clubbing or cyanosis; moves all 4 extremities normally  Psychiatric: Normal mood and affect; answers questions appropriately  Dermatologic: No rashes; normal skin turgor    Diagnostic testing:    EKG: Normal sinus rhythm    Labs:   Lab Results   Component Value Date    INR 1.03 01/01/2025        Lab Results   Component Value Date    WBC 2.4 01/02/2025    HGB 11.8 01/02/2025    HCT 34.9 01/02/2025    .0 01/02/2025    CREATSERUM 0.74 01/02/2025    BUN 7 01/02/2025     01/02/2025    K 4.2 01/02/2025    CL 97 01/02/2025    CO2 22.0 01/02/2025     01/02/2025    CA 8.3 01/02/2025    ALB 3.9 01/02/2025    ALKPHO 74 01/02/2025    BILT 0.4 01/02/2025    TP 6.3 01/02/2025    AST 23 01/02/2025    ALT 16 01/02/2025    PTT 29.2 01/01/2025    INR 1.03 01/01/2025    PTP 13.6 01/01/2025    MG 2.2 01/02/2025         Thank you for allowing our practice to participate in the care of your patient. Please do not hesitate to contact me if you have any questions.    Mamadou Gilbert MD  1/2/2025  10:21 AM'         [1]   Allergies  Allergen Reactions    Penicillins HIVES    Pneumococcal Vaccines HIVES and ITCHING

## 2025-01-02 NOTE — PLAN OF CARE
NURSING ADMISSION NOTE      Patient admitted via transport  Oriented to room.  Safety precautions initiated.  Bed in low position.  Call light in reach.    Two person skin check completed with pct  Admission assessment complete. Patient oriented to room. Admission orders received and initiated. Safety precautions in place and reviewed with patient.    Assumed patient care approximately 1930. Patient is alert and oriented X4. SpO2 maintained on room air with saturation maintaining greater than 89%.. Converted to NSR 1st AV block on tele, heart rate controlled. Continent of bladder and bowel. Skin intact. No pain reported. Ambulates with steady gait with standby assist. Education provided on call light, medications, and plan of care, patient and family verbalize understanding.      Plan of care: Tele, HR control, ECHO      Problem: CARDIOVASCULAR - ADULT  Goal: Maintains optimal cardiac output and hemodynamic stability  Description: INTERVENTIONS:  - Monitor vital signs, rhythm, and trends  - Monitor for bleeding, hypotension and signs of decreased cardiac output  - Evaluate effectiveness of vasoactive medications to optimize hemodynamic stability  - Monitor arterial and/or venous puncture sites for bleeding and/or hematoma  - Assess quality of pulses, skin color and temperature  - Assess for signs of decreased coronary artery perfusion - ex. Angina  - Evaluate fluid balance, assess for edema, trend weights  Outcome: Progressing  Goal: Absence of cardiac arrhythmias or at baseline  Description: INTERVENTIONS:  - Continuous cardiac monitoring, monitor vital signs, obtain 12 lead EKG if indicated  - Evaluate effectiveness of antiarrhythmic and heart rate control medications as ordered  - Initiate emergency measures for life threatening arrhythmias  - Monitor electrolytes and administer replacement therapy as ordered  Outcome: Progressing

## 2025-01-02 NOTE — ED PROVIDER NOTES
Patient Seen in: [facility] Emergency Department      History     Chief Complaint   Patient presents with    Difficulty Breathing     Stated Complaint: Dx with viral pharyngitis on 12/29. Not getting better. ROSSY today    Subjective:   HPI      Sore throat and feeling fatigued- at [facility] given abx and didn't help some difficluty breathing and his symptoms seem to be worsening today.  Also having some difficulty breathing comes to the emergency department with her son        Objective:     Past Medical History:    Arrhythmia    Hearing impaired person, bilateral    High blood pressure    High cholesterol    HYPERLIPIDEMIA    HYPERTENSION    Osteoarthritis    Visual impairment              Past Surgical History:   Procedure Laterality Date    Hysterectomy      complete/excessive bleeding                Social History     Socioeconomic History    Marital status:     Number of children: 5   Occupational History    Occupation: retired     Comment: housewife   Tobacco Use    Smoking status: Never    Smokeless tobacco: Never   Substance and Sexual Activity    Alcohol use: No    Drug use: No    Sexual activity: Yes     Partners: Male     Social Drivers of Health     Food Insecurity: No Food Insecurity (1/1/2025)    Food Insecurity     Food Insecurity: Never true   Transportation Needs: No Transportation Needs (1/1/2025)    Transportation Needs     Lack of Transportation: No   Housing Stability: Low Risk  (1/1/2025)    Housing Stability     Housing Instability: No                  Physical Exam     ED Triage Vitals [01/01/25 1735]   BP (!) 175/72   Pulse (!) 125   Resp 20   Temp 97.3 °F (36.3 °C)   Temp src Temporal   SpO2 96 %   O2 Device None (Room air)       Current Vitals:   Vital Signs  BP: 136/65  Pulse: 66  Resp: 20  Temp: 98.8 °F (37.1 °C)  Temp src: Oral  MAP (mmHg): 85    Oxygen Therapy  SpO2: 96 %  O2 Device: None (Room air)  O2 Flow Rate (L/min): 0 L/min  Pulse Oximetry Type: Intermittent  Oximetry Probe  Site Changed: No  Pulse Ox Probe Location: Left hand        Physical Exam  Vitals and nursing note reviewed.   Constitutional:       General: She is not in acute distress.     Appearance: She is well-developed. She is not diaphoretic.      Comments: Actually quite well-appearing woman lying on emergency department bed she is awake alert and oriented   HENT:      Head: Atraumatic.      Right Ear: External ear normal.      Left Ear: External ear normal.      Nose: Nose normal.   Eyes:      General: No scleral icterus.        Right eye: No discharge.         Left eye: No discharge.      Conjunctiva/sclera: Conjunctivae normal.      Pupils: Pupils are equal, round, and reactive to light.   Neck:      Vascular: No JVD.   Cardiovascular:      Rate and Rhythm: Tachycardia present. Rhythm irregular.      Heart sounds: Normal heart sounds. No murmur heard.     No friction rub. No gallop.   Pulmonary:      Effort: Pulmonary effort is normal. No respiratory distress.      Breath sounds: Normal breath sounds. No stridor. No wheezing.   Chest:      Chest wall: No tenderness.   Abdominal:      General: Bowel sounds are normal. There is no distension.      Palpations: Abdomen is soft.      Tenderness: There is no abdominal tenderness. There is no guarding or rebound.   Musculoskeletal:         General: No tenderness or deformity. Normal range of motion.      Cervical back: Normal range of motion and neck supple.   Lymphadenopathy:      Cervical: No cervical adenopathy.   Skin:     General: Skin is warm and dry.      Coloration: Skin is not pale.      Findings: No erythema or rash.   Neurological:      Mental Status: She is alert and oriented to person, place, and time.      Cranial Nerves: No cranial nerve deficit.      Coordination: Coordination normal.   Psychiatric:         Behavior: Behavior normal.         Judgment: Judgment normal.             ED Course     Labs Reviewed   CBC WITH DIFFERENTIAL WITH PLATELET - Abnormal;  Notable for the following components:       Result Value    WBC 2.9 (*)     .0 (*)     Neutrophil Absolute Prelim 1.32 (*)     Neutrophil Absolute 1.32 (*)     Lymphocyte Absolute 0.79 (*)     All other components within normal limits   COMP METABOLIC PANEL (14) - Abnormal; Notable for the following components:    Glucose 111 (*)     Sodium 129 (*)     Chloride 94 (*)     Calculated Osmolality 268 (*)     All other components within normal limits   COMP METABOLIC PANEL (14) - Abnormal; Notable for the following components:    Glucose 123 (*)     Sodium 129 (*)     Chloride 97 (*)     BUN 7 (*)     Calcium, Total 8.3 (*)     Calculated Osmolality 267 (*)     All other components within normal limits   CBC WITH DIFFERENTIAL WITH PLATELET - Abnormal; Notable for the following components:    WBC 2.4 (*)     HGB 11.8 (*)     HCT 34.9 (*)     .0 (*)     Neutrophil Absolute Prelim 1.05 (*)     Neutrophil Absolute 1.05 (*)     Lymphocyte Absolute 0.76 (*)     All other components within normal limits   SARS-COV-2/FLU A AND B/RSV BY PCR (GENEIPXIPERT) - Abnormal; Notable for the following components:    SARS-CoV-2 (COVID-19) - (GeneXpert) Detected (*)     All other components within normal limits    Narrative:     This test is intended for the qualitative detection and differentiation of SARS-CoV-2, influenza A, influenza B, and respiratory syncytial virus (RSV) viral RNA in nasopharyngeal or nares swabs from individuals suspected of respiratory viral infection consistent with COVID-19 by their healthcare provider. Signs and symptoms of respiratory viral infection due to SARS-CoV-2, influenza, and RSV can be similar.    Test performed using the Xpert Xpress SARS-CoV-2/FLU/RSV (real time RT-PCR)  assay on the GeneXpert instrument, [employer], [place] [address].   This test is being used under the Food and Drug Administration's Emergency Use Authorization.    The authorized Fact Sheet for Healthcare Providers for  this assay is available upon request from the laboratory.   TROPONIN I HIGH SENSITIVITY - Normal   PTT, ACTIVATED - Normal   PROTHROMBIN TIME (PT) - Normal   MAGNESIUM - Normal     EKG    Rate, intervals and axes as noted on EKG Report.  Rate: 128  Rhythm: Atrial fibrillation Reading: Atrial fibrillation with rapid ventricular response with multiple                Chest x-rays independently interpreted by myself does not show any signs of significant vascular overload, cardiomegaly, infiltrates in the pulmonary fields.  This is a reassuring chest x-ray for no signs of pneumonia, CHF       MDM      84-year-old presents to the emergency department with shortness of breath difficulty breathing upon arrival she is noted to be tachycardic with an irregular rhythm.  She states she was given antibiotics for a  Sore throat last week after negative strep test at the urgent care.  PCPs prescribed Zithromax but patient was not improving.  No COVID or flu testing done    Differential diagnosis includes pneumonia, arrhythmia, CHF, atypical presentation of acute coronary syndrome, COVID, flu, pericarditis, myocarditis    Patient is testing positive for COVID-19, azithromycin can precipitate cardiac arrhythmias given his QT prolongation however, this is atrial fibrillation with rapid ventricular response.  Discussed with the patient that this may have occurred even without the COVID but COVID can be a precipitating factor.  Patient was started on Cardizem without a bolus improving.  I would like her to stay overnight in the hospital she is likely been in this for a few days given a dose of Lovenox cardiology consulted admitted to the floor CTU after discussing with hospital and cardiologist      Medical Decision Making      Disposition and Plan     Clinical Impression:  1. Atrial fibrillation, new onset (HCC)    2. COVID-19    3. Hyponatremia         Disposition:  There is no disposition on file for this visit.  There is no  disposition time on file for this visit.    Follow-up:  Dana Long MD  [address]  [address]  [facility] [address] [phone]    Schedule an appointment as soon as possible for a visit in 1 week(s)      Mamadou Gilbert MD  [address]  [address]  [facility] [address] [phone]    Schedule an appointment as soon as possible for a visit  in 2-4 weeks          Medications Prescribed:  Current Discharge Medication List        START taking these medications    Details   apixaban 2.5 MG Oral Tab Take 1 tablet (2.5 mg total) by mouth 2 (two) times daily.  Qty: 180 tablet, Refills: 0                 Supplementary Documentation:

## 2025-01-02 NOTE — PLAN OF CARE
Acquired patient around 0730. Alert and oriented x4. On Ra. SpO2 within normal limits. SR w 1st AVB on tele. Pt denies sob cp. Continent of bowel and bladder. Plan for echo today and patient to be sent home on [facility]. Coupon given. Call light within reach. Continue plan of care.     1558: This RN called [facility] about Echo results, since the echo was completed. Per tech that the results will be posted once the cardiologist reads it.      Problem: Patient/Family Goals  Goal: Patient/Family Long Term Goal  Description: Patient's Long Term Goal: to stay out of hospital    Interventions:  - fu appointments  - take mediations as prescribed   - See additional Care Plan goals for specific interventions  Outcome: Progressing  Goal: Patient/Family Short Term Goal  Description: Patient's Short Term Goal: to feel better    Interventions:   - monitor HR  - echo  - Cardiology consult  - See additional Care Plan goals for specific interventions  Outcome: Progressing     Problem: CARDIOVASCULAR - ADULT  Goal: Maintains optimal cardiac output and hemodynamic stability  Description: INTERVENTIONS:  - Monitor vital signs, rhythm, and trends  - Monitor for bleeding, hypotension and signs of decreased cardiac output  - Evaluate effectiveness of vasoactive medications to optimize hemodynamic stability  - Monitor arterial and/or venous puncture sites for bleeding and/or hematoma  - Assess quality of pulses, skin color and temperature  - Assess for signs of decreased coronary artery perfusion - ex. Angina  - Evaluate fluid balance, assess for edema, trend weights  Outcome: Progressing  Goal: Absence of cardiac arrhythmias or at baseline  Description: INTERVENTIONS:  - Continuous cardiac monitoring, monitor vital signs, obtain 12 lead EKG if indicated  - Evaluate effectiveness of antiarrhythmic and heart rate control medications as ordered  - Initiate emergency measures for life threatening arrhythmias  - Monitor electrolytes and  administer replacement therapy as ordered  Outcome: Progressing

## 2025-01-02 NOTE — PROGRESS NOTES
01/01/25 2116 01/01/25 2117 01/01/25 2119   Vital Signs   Temp 97.8 °F (36.6 °C)  --   --    Temp src Oral  --   --    Pulse 114 98 (!) 138   Heart Rate Source Monitor  --   --    Cardiac Rhythm Afib  --   --    Resp 16  --   --    Respiratory Quality Normal  --   --    /73 157/77  --    MAP (mmHg) 91 96  --    BP Location Left arm Left arm  --    BP Method Automatic Automatic  --    Patient Position Lying Sitting  --       01/01/25 2120   Vital Signs   Temp 98.8 °F (37.1 °C)   Temp src Oral   Pulse (!) 133   Heart Rate Source  --    Cardiac Rhythm  --    Resp  --    Respiratory Quality  --    /84   MAP (mmHg) 95   BP Location Left arm   BP Method Automatic   Patient Position Standing     [facility] 12/01/2025

## 2025-01-02 NOTE — CM/SW NOTE
these medications at [id number] - [place], [place] - [address] [phone], [phone]   apixaban  Your estimated payment per fill: $458    Pt can use a free 30 day coupon for Elliquis. Cost is anticipated to be high due to the first of the new year and needing to meet deductibles.     RN updated.     Humaira COX, LSW  Discharge Planner

## 2025-01-02 NOTE — ED QUICK NOTES
Orders for admission, patient is aware of plan and ready to go upstairs. Any questions, please call ED RN Dixie at extension [phone].     Patient Covid vaccination status: Unvaccinated     COVID Test Ordered in ED: SARS-CoV-2/Flu A and B/RSV by PCR (GeneXpert)    COVID Suspicion at Admission: N/A    Running Infusions:    dilTIAZem 5 mg/hr (01/01/25 1946)        Mental Status/LOC at time of transport: A&O x4    Other pertinent information:   CIWA score: N/A   NIH score:  N/A

## 2025-01-02 NOTE — H&P
DM Hospitalist H&P       CC:   Chief Complaint   Patient presents with    Difficulty Breathing        PCP: Dana Long MD    History of Present Illness: Patient is a 84 year old female with PMH sig for hypertension, hyperlipidemia who presented for evaluation of cough.  Patient states that she had sore throat on Monday which got better but then she developed this cough that has been bothering her.  She came to the hospital for evaluation and was found to be in A-fib with RVR.  She denies any fever shortness of breath nausea vomiting chest pain diarrhea or any other symptoms.  She lives independently and able to perform her ADLs independently.  In the ER, vitals were significant for heart rate of 125, respirations 20, blood pressure 175/72.  Labs with neutropenia and thrombocytopenia..  She was noted to be positive for COVID-19 infection.  She is admitted for further workup and management.      PMH  Past Medical History:    Arrhythmia    Hearing impaired person, bilateral    High blood pressure    High cholesterol    HYPERLIPIDEMIA    HYPERTENSION    Osteoarthritis    Visual impairment        PSH  Past Surgical History:   Procedure Laterality Date    Hysterectomy      complete/excessive bleeding        ALL:  Allergies[1]     Home Medications:  Medications Taking[2]      Soc Hx  Social History     Tobacco Use    Smoking status: Never    Smokeless tobacco: Never   Substance Use Topics    Alcohol use: No        Fam Hx  Family History   Problem Relation Age of Onset    Diabetes Mother        Review of Systems  Comprehensive ROS reviewed and negative except for what's stated above.     OBJECTIVE:  /64 (BP Location: Left arm)   Pulse 60   Temp 98.4 °F (36.9 °C) (Oral)   Resp 18   Ht 4' 11\" (1.499 m)   Wt 116 lb 6.5 oz (52.8 kg)   SpO2 99%   BMI 23.51 kg/m²   General:  Alert, no distress elderly female   Head:  Normocephalic, without obvious abnormality, atraumatic.   Eyes:  Sclera anicteric, No  conjunctival pallor, EOMs intact.    Nose: Nares normal. Septum midline. Mucosa normal. No drainage.   Throat: Lips, mucosa, and tongue normal. Teeth and gums normal.,  Hoarse voice   Neck: Supple, symmetrical, trachea midline,   Lungs:   Clear to auscultation bilaterally. Normal effort   Chest wall:  No tenderness or deformity.   Heart:  Regular rate and rhythm, S1, S2 normal,    Abdomen:   Soft, non-tender. Bowel sounds normal.   Non distended   Extremities: Extremities normal, atraumatic, no cyanosis or edema.,  Ambulating well independently   Skin: Skin color, texture, turgor normal. No rashes or lesions.    Neurologic: Normal strength, no focal deficit appreciated     Diagnostic Data:    CBC/Chem  Recent Labs   Lab 01/01/25 1839 01/02/25  0508   WBC 2.9* 2.4*   HGB 13.5 11.8*   MCV 86.7 91.1   .0* 127.0*   INR 1.03  --        Recent Labs   Lab 01/01/25 1839 01/02/25  0508   * 129*   K 3.5 4.2   CL 94* 97*   CO2 26.0 22.0   BUN 10 7*   CREATSERUM 0.80 0.74   * 123*   CA 9.0 8.3*   MG  --  2.2       Recent Labs   Lab 01/01/25 1839 01/02/25  0508   ALT 18 16   AST 28 23   ALB 4.3 3.9       No results for input(s): \"TROP\" in the last 168 hours.    CXR: image personally reviewed     Radiology: XR CHEST AP/PA (1 VIEW) (CPT=71045)    Result Date: 1/1/2025  CONCLUSION:  No focal consolidation.   LOCATION:  [facility]      Dictated by (CST): Yeni Chau MD on 1/01/2025 at 6:21 PM     Finalized by (CST): Yeni Chau MD on 1/01/2025 at 6:22 PM          ASSESSMENT / PLAN:     Patient is a 84 year old female with PMH sig for hypertension, hyperlipidemia who presented for evaluation of cough.  Patient is managed for A-fib with RVR in the setting of COVID-19 infection    A-fib with RVR, new onset  - In the setting of COVID-19 infection  - Diltiazem drip, transition to p.o.  - Started on metoprolol  - Started on Eliquis 2.5 mg twice daily  - Telemetry  - 2D echo  - Cardiology consult, recommendations  reviewed    COVID-19 infection  - Asymptomatic  - Onset 5 days ago, will hold off on any treatments at this time as patient is already improving  - Recommend supportive care for cough    Hypertension  - Continue metoprolol  - Holding valsartan hydrochlorothiazide to allow for rate control    Hyperlipidemia  - Continue simvastatin      FN:  - IVF: None  - Diet: Cardiac    DVT Prophy: SCDs, Eliquis  Atrophy: Ambulate as tolerated  Lines: PIV    Dispo: admit    Outpatient records or previous hospital records reviewed.     Further recommendations pending patient's clinical course.  [facility] hospitalist to continue to follow patient while in house    Patient and/or patient's family given opportunity to ask questions and note understanding and agreeing with therapeutic plan as outlined    Thank You,  DO Michela Tinsley Hospitalist  Answering Service number: [phone]         [1]   Allergies  Allergen Reactions    Penicillins HIVES    Pneumococcal Vaccines HIVES and ITCHING   [2]   Outpatient Medications Marked as Taking for the 1/1/25 encounter (Hospital Encounter)   Medication Sig Dispense Refill    apixaban 2.5 MG Oral Tab Take 1 tablet (2.5 mg total) by mouth 2 (two) times daily. 180 tablet 0    azithromycin 250 MG Oral Tab Take two tablets today, then one tablet daily for 4 more days      aspirin 81 MG Oral Tab EC Take 1 tablet (81 mg total) by mouth at bedtime.      Lidocaine Viscous HCl 2 % Mouth/Throat Solution Take 5-10 mL by mouth every 3 (three) hours as needed for Pain.      simvastatin 20 MG Oral Tab Take 1 tablet (20 mg total) by mouth nightly. 90 tablet 3    metoprolol succinate 100 MG Oral Tablet 24 Hr Take 1 tablet (100 mg total) by mouth daily. (Patient taking differently: Take 1 tablet (100 mg total) by mouth at bedtime.) 90 tablet 3    valsartan-hydroCHLOROthiazide 160-12.5 MG Oral Tab Take 1 tablet by mouth daily. (Patient taking differently: Take 1 tablet by mouth at bedtime.) 90 tablet 3     Ipratropium Bromide 0.03 % Nasal Solution 2 sprays by Nasal route every 12 (twelve) hours. (Patient taking differently: 2 sprays by Nasal route every 12 (twelve) hours as needed.) 1 Bottle 3

## 2025-01-03 LAB
ATRIAL RATE: 72 BPM
P AXIS: 53 DEGREES
P-R INTERVAL: 326 MS
Q-T INTERVAL: 420 MS
QRS DURATION: 92 MS
QTC CALCULATION (BEZET): 459 MS
R AXIS: 45 DEGREES
T AXIS: 61 DEGREES
VENTRICULAR RATE: 72 BPM

## 2025-01-03 NOTE — PROGRESS NOTES
Pt dc'd home as ordered/scheduled. Dc instructions provided to pt; pt verbalizes understanding of instructions. Tele monitor off and returned. IV's dc'd prior to dc home. Pt to [facility] for dc home, via wheelchair, accompanied by PCT.

## 2025-01-06 NOTE — PAYOR COMM NOTE
ADMISSION REVIEW     Payor: [employer]  Subscriber #:  [id number]  Authorization Number: [id number]    Admit date: 1/1/25  Admit time:  8:29 PM       REVIEW DOCUMENTATION:     ED Provider Notes        ED Provider Notes signed by Fiona Brownlee MD at 1/2/2025  5:33 PM        Chief Complaint   Patient presents with    Difficulty Breathing     Stated Complaint: Dx with viral pharyngitis on 12/29. Not getting better. ROSSY today    Subjective:   HPI      Sore throat and feeling fatigued- at UC given abx and didn't help some difficluty breathing and his symptoms seem to be worsening today.  Also having some difficulty breathing comes to the emergency department with her son        Objective:     Past Medical History:    Arrhythmia    Hearing impaired person, bilateral    High blood pressure    High cholesterol    HYPERLIPIDEMIA    HYPERTENSION    Osteoarthritis    Visual impairment     Past Surgical History:   Procedure Laterality Date    Hysterectomy      complete/excessive bleeding           ED Triage Vitals [01/01/25 1735]   BP (!) 175/72   Pulse (!) 125   Resp 20   Temp 97.3 °F (36.3 °C)   Temp src Temporal   SpO2 96 %   O2 Device None (Room air)       Current Vitals:   Vital Signs  BP: 136/65  Pulse: 66  Resp: 20  Temp: 98.8 °F (37.1 °C)  Temp src: Oral  MAP (mmHg): 85    Oxygen Therapy  SpO2: 96 %  O2 Device: None (Room air)  O2 Flow Rate (L/min): 0 L/min  Pulse Oximetry Type: Intermittent  Oximetry Probe Site Changed: No  Pulse Ox Probe Location: Left hand        Physical Exam  Vitals and nursing note reviewed.   Constitutional:       General: She is not in acute distress.     Appearance: She is well-developed. She is not diaphoretic.      Comments: Actually quite well-appearing woman lying on emergency department bed she is awake alert and oriented   HENT:      Head: Atraumatic.      Right Ear: External ear normal.      Left Ear: External ear normal.      Nose: Nose normal.   Eyes:      General: No  scleral icterus.        Right eye: No discharge.         Left eye: No discharge.      Conjunctiva/sclera: Conjunctivae normal.      Pupils: Pupils are equal, round, and reactive to light.   Neck:      Vascular: No JVD.   Cardiovascular:      Rate and Rhythm: Tachycardia present. Rhythm irregular.      Heart sounds: Normal heart sounds. No murmur heard.     No friction rub. No gallop.   Pulmonary:      Effort: Pulmonary effort is normal. No respiratory distress.      Breath sounds: Normal breath sounds. No stridor. No wheezing.   Chest:      Chest wall: No tenderness.   Abdominal:      General: Bowel sounds are normal. There is no distension.      Palpations: Abdomen is soft.      Tenderness: There is no abdominal tenderness. There is no guarding or rebound.   Musculoskeletal:         General: No tenderness or deformity. Normal range of motion.      Cervical back: Normal range of motion and neck supple.   Lymphadenopathy:      Cervical: No cervical adenopathy.   Skin:     General: Skin is warm and dry.      Coloration: Skin is not pale.      Findings: No erythema or rash.   Neurological:      Mental Status: She is alert and oriented to person, place, and time.      Cranial Nerves: No cranial nerve deficit.      Coordination: Coordination normal.   Psychiatric:         Behavior: Behavior normal.         Judgment: Judgment normal.             ED Course     Labs Reviewed   CBC WITH DIFFERENTIAL WITH PLATELET - Abnormal; Notable for the following components:       Result Value    WBC 2.9 (*)     .0 (*)     Neutrophil Absolute Prelim 1.32 (*)     Neutrophil Absolute 1.32 (*)     Lymphocyte Absolute 0.79 (*)     All other components within normal limits   COMP METABOLIC PANEL (14) - Abnormal; Notable for the following components:    Glucose 111 (*)     Sodium 129 (*)     Chloride 94 (*)     Calculated Osmolality 268 (*)     All other components within normal limits   COMP METABOLIC PANEL (14) - Abnormal; Notable for  the following components:    Glucose 123 (*)     Sodium 129 (*)     Chloride 97 (*)     BUN 7 (*)     Calcium, Total 8.3 (*)     Calculated Osmolality 267 (*)     All other components within normal limits   CBC WITH DIFFERENTIAL WITH PLATELET - Abnormal; Notable for the following components:    WBC 2.4 (*)     HGB 11.8 (*)     HCT 34.9 (*)     .0 (*)     Neutrophil Absolute Prelim 1.05 (*)     Neutrophil Absolute 1.05 (*)     Lymphocyte Absolute 0.76 (*)     All other components within normal limits   SARS-COV-2/FLU A AND B/RSV BY PCR (GENEXPERT) - Abnormal; Notable for the following components:    SARS-CoV-2 (COVID-19) - (GeneXpert) Detected (*)     All other components within normal limits    Narrative:     This test is intended for the qualitative detection and differentiation of SARS-CoV-2, influenza A, influenza B, and respiratory syncytial virus (RSV) viral RNA in nasopharyngeal or nares swabs from individuals suspected of respiratory viral infection consistent with COVID-19 by their healthcare provider. Signs and symptoms of respiratory viral infection due to SARS-CoV-2, influenza, and RSV can be similar.    Test performed using the Xpert Xpress SARS-CoV-2/FLU/RSV (real time RT-PCR)  assay on the GeneXpert instrument, [facility], [place] [address].   This test is being used under the Food and Drug Administration's Emergency Use Authorization.    The authorized Fact Sheet for Healthcare Providers for this assay is available upon request from the laboratory.   TROPONIN I HIGH SENSITIVITY - Normal   PTT, ACTIVATED - Normal   PROTHROMBIN TIME (PT) - Normal   MAGNESIUM - Normal     EKG    Rate, intervals and axes as noted on EKG Report.  Rate: 128  Rhythm: Atrial fibrillation Reading: Atrial fibrillation with rapid ventricular response with multiple         84-year-old presents to the emergency department with shortness of breath difficulty breathing upon arrival she is noted to be tachycardic with an  irregular rhythm.  She states she was given antibiotics for a  Sore throat last week after negative strep test at the urgent care.  PCPs prescribed Zithromax but patient was not improving.  No COVID or flu testing done    Differential diagnosis includes pneumonia, arrhythmia, CHF, atypical presentation of acute coronary syndrome, COVID, flu, pericarditis, myocarditis    Patient is testing positive for COVID-19, azithromycin can precipitate cardiac arrhythmias given his QT prolongation however, this is atrial fibrillation with rapid ventricular response.  Discussed with the patient that this may have occurred even without the COVID but COVID can be a precipitating factor.  Patient was started on Cardizem without a bolus improving.  I would like her to stay overnight in the hospital she is likely been in this for a few days given a dose of Lovenox cardiology consulted admitted to the floor CTU after discussing with hospital and cardiologist      Clinical Impression:  1. Atrial fibrillation, new onset (HCC)    2. COVID-19    3. Hyponatremia           1/2 consult Cardiology    Impression:  1. AF RVR, new diagnosis  2. sore throat--> viral pharyngitis, +COVID     Recommendations:  - AF: discussed diagnosis and likely trigger being COVID related SIRS. Reviewed diagnosis and CVA risk.  Also reviewed treatment options and role for anticoagulation to mitigate CVA risk.  Rates controlled; continue home dose metoprolol. Will start apixaban 2.5mg po bid.  f/u TTE.  If unremarkable, ok for dc from cv standpoint and f/u in office 2-4 weeks.             History of Present Illness:  Susan Atkinson is a 84 year old female who presented to [facility] on 1/1/2025.     See in cardiology consultation for AF RVR, new diagnosis.  83 YO F in good health, who presented to the ER with sore throat and SOB.  Earlier seen at immediate care, instructed to go to ER if symptoms persistent/worse. ER work-up notable for +COVID PCR,  ECG/telemetry- AF RVR, self converted to SR. This AM, she feels well and hopes to go home.  Denies CP/SOB/palpitations.  on RA.         Review of Systems:  Constitutional: negative for fevers  Eyes: negative for visual disturbance  Ears, nose, mouth, throat, and face: negative for epistaxis  Respiratory: negative for dyspnea on exertion  Cardiovascular: negative for chest pain  Gastrointestinal: negative for melena  Genitourinary:negative for hematuria  Hematologic/lymphatic: negative for bleeding  Musculoskeletal:negative for myalgias  Neurological: negative for dizziness and headaches  Endocrine: negative for temperature intolerance        Physical Exam:  Blood pressure 117/58, pulse 64, temperature 98.4 °F (36.9 °C), temperature source Oral, resp. rate 16, height 4' 11\" (1.499 m), weight 116 lb 6.5 oz (52.8 kg), SpO2 99%, not currently breastfeeding.  Temp (24hrs), Av.1 °F (36.7 °C), Min:97.3 °F (36.3 °C), Max:98.8 °F (37.1 °C)     General: Awake and alert; in no acute distress  HEENT: Extraocular movements are intact; sclerae are anicteric; scalp is atrauamatic; no thyromegaly  Neck: Supple; no JVD; no carotid bruits  Cardiac: Regular rate and regular rhythm; no murmurs/rubs/gallops are appreciated; PMI is non-displaced; there is no evidence of a sternal heave  Lungs: Clear to auscultation bilaterally; no accessory muscle use is noted  Abdomen: Soft, non-tender; bowel sounds are normoactive; no hepatosplenomegaly  Extremities: No clubbing or cyanosis; moves all 4 extremities normally  Psychiatric: Normal mood and affect; answers questions appropriately  Dermatologic: No rashes; normal skin turgor      1/2 H&P     Patient presents with    Difficulty Breathing        History of Present Illness: Patient is a 84 year old female with PMH sig for hypertension, hyperlipidemia who presented for evaluation of cough.  Patient states that she had sore throat on Monday which got better but then she developed this cough  that has been bothering her.  She came to the hospital for evaluation and was found to be in A-fib with RVR.  She denies any fever shortness of breath nausea vomiting chest pain diarrhea or any other symptoms.  She lives independently and able to perform her ADLs independently.  In the ER, vitals were significant for heart rate of 125, respirations 20, blood pressure 175/72.  Labs with neutropenia and thrombocytopenia..  She was noted to be positive for COVID-19 infection.  She is admitted for further workup and management.        PMH  Past Medical History       Past Medical History:    Arrhythmia    Hearing impaired person, bilateral    High blood pressure    High cholesterol    HYPERLIPIDEMIA    HYPERTENSION    Osteoarthritis    Visual impairment            PSH  Past Surgical History         Past Surgical History:   Procedure Laterality Date    Hysterectomy         complete/excessive bleeding            OBJECTIVE:  /64 (BP Location: Left arm)   Pulse 60   Temp 98.4 °F (36.9 °C) (Oral)   Resp 18   Ht 4' 11\" (1.499 m)   Wt 116 lb 6.5 oz (52.8 kg)   SpO2 99%   BMI 23.51 kg/m²   General:  Alert, no distress elderly female   Head:  Normocephalic, without obvious abnormality, atraumatic.   Eyes:  Sclera anicteric, No conjunctival pallor, EOMs intact.    Nose: Nares normal. Septum midline. Mucosa normal. No drainage.   Throat: Lips, mucosa, and tongue normal. Teeth and gums normal.,  Hoarse voice   Neck: Supple, symmetrical, trachea midline,   Lungs:   Clear to auscultation bilaterally. Normal effort   Chest wall:  No tenderness or deformity.   Heart:  Regular rate and rhythm, S1, S2 normal,    Abdomen:   Soft, non-tender. Bowel sounds normal.   Non distended   Extremities: Extremities normal, atraumatic, no cyanosis or edema.,  Ambulating well independently   Skin: Skin color, texture, turgor normal. No rashes or lesions.    Neurologic: Normal strength, no focal deficit appreciated       ASSESSMENT / PLAN:       Patient is a 84 year old female with PMH sig for hypertension, hyperlipidemia who presented for evaluation of cough.  Patient is managed for A-fib with RVR in the setting of COVID-19 infection     A-fib with RVR, new onset  - In the setting of COVID-19 infection  - Diltiazem drip, transition to p.o.  - Started on metoprolol  - Started on Eliquis 2.5 mg twice daily  - Telemetry  - 2D echo  - Cardiology consult, recommendations reviewed     COVID-19 infection  - Asymptomatic  - Onset 5 days ago, will hold off on any treatments at this time as patient is already improving  - Recommend supportive care for cough     Hypertension  - Continue metoprolol  - Holding valsartan hydrochlorothiazide to allow for rate control     Hyperlipidemia  - Continue simvastatin        FN:  - IVF: None  - Diet: Cardiac     DVT Prophy: SCDs, Eliquis  Atrophy: Ambulate as tolerated  Lines: PIV     Dispo: admit     Outpatient records or previous hospital records reviewed.      Further recommendations pending patient's clinical course.  [facility] hospitalist to continue to follow patient while in house     Patient and/or patient's family given opportunity to ask questions and note understanding and agreeing with therapeutic plan as outlined           MEDICATIONS ADMINISTERED IN LAST 1 DAY:  Administration History          magnesium sulfate in dextrose 5% 1 g/100mL infusion premix 1 g  Dose: 1 g  Freq: Once Route: IV  Last Dose: 1 g (01/01/25 1958)  Start: 01/01/25 1943 End: 01/01/25 2058   Order specific questions:               1958 SC-New Bag     2035 SC-Handoff                          potassium chloride (Klor-Con M20) tab 40 mEq  Dose: 40 mEq  Freq: Once Route: OR  Start: 01/01/25 1943 End: 01/01/25 1958   Admin Instructions:   Do not crush            1958 SC-Given         sodium chloride 0.9 % IV bolus 1,000 mL  Dose: 1,000 mL  Freq: Once Route: IV  Last Dose: Stopped (01/01/25 1953)  Start: 01/01/25 1839 End: 01/01/25 1953            1840  MB-New Bag     1953 SC-Stopped                 Medications 12/24 12/25 12/26 12/27 12/28 12/29 12/30 12/31 01/01 01/02   dilTIAZem (cardIZEM) 100 mg in sodium chloride 0.9% 100 mL IVPB-ADDV  Rate: 2.5-20 mL/hr Dose: 2.5-20 mg/hr  Freq: Continuous Route: IV  Last Dose: Stopped (01/02/25 0547)  Start: 01/01/25 2230 End: 01/02/25 2130   Admin Instructions:   Do not start Diltiazem drip if initial SBP < 90 mmHg or HR < 100    Titration instructions for Cardizem drip:  If after 60 minutes of initiation of drip HR is > 120 beats per minute, RE-BOLUS with 10mg Diltiazem and then increase the continuous drip rate by 5mg/hour, not to exceed 20mg/hr    For patients not taking PO Cardizem:  If heart rate is < 90 for greater than 30 minutes, decrease the drip by 5 mg/hour  If rate < 75 for > 5 min or symptomatic, discontinue the drip and notify physician (cardiology)    Hold Diltiazem drip for SBP < 80 mmHg or symptomatic hypotension and notify physician (cardiology)    For pause greater than 3.0 seconds, hold drip and notify physician (cardiology)    Transitioning from IV Diltiazem to Oral Diltiazem:  If HR is <100 after the patient has received the scheduled ordered oral dose, decrease the drip by 5 mg/hr. Continue to decrease the IV dose hourly by 5 mg/hour until off. For HR <75, turn the intravenous drip off.   Order specific questions:               2321 RL-New Bag      0547 RL-Stopped     2130-D/C'd      dilTIAZem (cardIZEM) 100 mg in sodium chloride 0.9% 100 mL IVPB-ADDV  Rate: 5 mL/hr Dose: 5 mg/hr  Freq: Continuous Route: IV  Last Dose: Stopped (01/01/25 2321)  Start: 01/01/25 1942 End: 01/01/25 2230   Admin Instructions:   (Conc=1mg/mL) Titrate to maintain HR<120; keep SBP >90, unless otherwise specified by provider   Order specific questions:               1946 SC-New Bag     2035 SC-Handoff     2230-D/C'd  2321 RL-Stopped         sodium chloride 0.9% infusion  Rate: 50 mL/hr  Freq: Continuous Route: IV  Start:  01/01/25 2230 End: 01/02/25 1029            (2230 RL)-Not Given      1029-D/C'd      Legend:             Vitals (last day) before discharge       Date/Time Temp Pulse Resp BP SpO2 Weight O2 Device O2 Flow Rate (L/min) [facility]    01/02/25 1615 98.8 °F (37.1 °C) 66 20 136/65 96 % -- None (Room air) 0 L/min NS    01/02/25 1136 98.4 °F (36.9 °C) 60 18 134/64 99 % -- None (Room air) 0 L/min NS    01/02/25 0758 98.4 °F (36.9 °C) 64 16 117/58 99 % -- None (Room air) 0 L/min NS    01/02/25 0629 -- 66 -- 117/58 -- -- -- --     01/02/25 0628 -- 65 -- 129/59 -- -- -- --     01/02/25 0627 -- 63 -- 123/60 -- -- -- --     01/02/25 0445 98.1 °F (36.7 °C) 69 18 125/57 100 % -- None (Room air) 0 L/min     01/02/25 0300 -- 71 -- -- -- -- -- --     01/01/25 2307 98.1 °F (36.7 °C) 88 18 137/71 96 % -- None (Room air) 0 L/min     01/01/25 2235 -- -- -- -- -- 116 lb 6.5 oz (52.8 kg) -- -- RL    01/01/25 2120 98.8 °F (37.1 °C) -- -- -- -- -- -- -- AA    01/01/25 2120 -- 133 -- 125/84 -- -- -- -- RL    01/01/25 2119 -- 138 -- -- -- -- -- -- RL    01/01/25 2117 -- 98 -- 157/77 -- -- -- --     01/01/25 2116 97.8 °F (36.6 °C) 114 16 154/73 96 % 116 lb 6.5 oz (52.8 kg) None (Room air) -- RL    01/01/25 1921 -- 129  21 157/97 100 % -- None (Room air) -- SC    01/01/25 1905 -- 116 17 -- 100 % -- -- -- MB    01/01/25 1900 -- 146 14 -- 94 % -- -- -- MB    01/01/25 1855 -- 109 18 -- 98 % -- -- -- MB    01/01/25 1850 -- 125 25 -- 97 % -- -- -- MB    01/01/25 1843 -- -- -- -- -- -- None (Room air) -- MB    01/01/25 1835 -- 131 18 141/77 96 % -- -- -- MB    01/01/25 1820 -- 84 17 143/105 97 % -- -- -- MB    01/01/25 1738 -- -- -- 159/77 -- -- -- -- MK    01/01/25 1735 97.3 °F (36.3 °C) 125 20 175/72 96 % 116 lb (52.6 kg) None (Room air) -- MK           Discharge Notification    Patient Name: SHAWNA MILLS WILFREDO  Payor: [employer]  Subscriber #: [id number]  Authorization Number: [id number]  Admit Date/Time: 1/1/2025 6:17 PM  Discharge  Date/Time: 1/2/2025 7:00 PM

## 2025-04-04 ENCOUNTER — HOSPITAL ENCOUNTER (EMERGENCY)
Facility: HOSPITAL | Age: 85
Discharge: HOME | End: 2025-04-04
Attending: EMERGENCY MEDICINE
Payer: MEDICARE

## 2025-04-04 ENCOUNTER — APPOINTMENT (OUTPATIENT)
Dept: GENERAL RADIOLOGY | Facility: HOSPITAL | Age: 85
End: 2025-04-04
Attending: EMERGENCY MEDICINE
Payer: MEDICARE

## 2025-04-04 VITALS
RESPIRATION RATE: 13 BRPM | HEART RATE: 52 BPM | HEIGHT: 59 IN | WEIGHT: 116 LBS | DIASTOLIC BLOOD PRESSURE: 87 MMHG | BODY MASS INDEX: 23.39 KG/M2 | SYSTOLIC BLOOD PRESSURE: 143 MMHG | OXYGEN SATURATION: 100 % | TEMPERATURE: 97 F

## 2025-04-04 DIAGNOSIS — R07.9 ACUTE CHEST PAIN: Primary | ICD-10-CM

## 2025-04-04 LAB
ALBUMIN SERPL-MCNC: 4.6 G/DL (ref 3.2–4.8)
ALBUMIN/GLOB SERPL: 1.7 {RATIO} (ref 1–2)
ALP LIVER SERPL-CCNC: 90 U/L
ALT SERPL-CCNC: 18 U/L
ANION GAP SERPL CALC-SCNC: 6 MMOL/L (ref 0–18)
AST SERPL-CCNC: 24 U/L (ref ?–34)
ATRIAL RATE: 67 BPM
BASOPHILS # BLD AUTO: 0.02 X10(3) UL (ref 0–0.2)
BASOPHILS NFR BLD AUTO: 0.4 %
BILIRUB SERPL-MCNC: 0.5 MG/DL (ref 0.2–1.1)
BUN BLD-MCNC: 19 MG/DL (ref 9–23)
CALCIUM BLD-MCNC: 9.9 MG/DL (ref 8.7–10.6)
CHLORIDE SERPL-SCNC: 98 MMOL/L (ref 98–112)
CO2 SERPL-SCNC: 30 MMOL/L (ref 21–32)
CREAT BLD-MCNC: 0.91 MG/DL
EGFRCR SERPLBLD CKD-EPI 2021: 61 ML/MIN/1.73M2 (ref 60–?)
EOSINOPHIL # BLD AUTO: 0.07 X10(3) UL (ref 0–0.7)
EOSINOPHIL NFR BLD AUTO: 1.4 %
ERYTHROCYTE [DISTWIDTH] IN BLOOD BY AUTOMATED COUNT: 12.3 %
GLOBULIN PLAS-MCNC: 2.7 G/DL (ref 2–3.5)
GLUCOSE BLD-MCNC: 99 MG/DL (ref 70–99)
HCT VFR BLD AUTO: 38 %
HGB BLD-MCNC: 12.9 G/DL
IMM GRANULOCYTES # BLD AUTO: 0.01 X10(3) UL (ref 0–1)
IMM GRANULOCYTES NFR BLD: 0.2 %
LYMPHOCYTES # BLD AUTO: 1.22 X10(3) UL (ref 1–4)
LYMPHOCYTES NFR BLD AUTO: 24.7 %
MCH RBC QN AUTO: 30.9 PG (ref 26–34)
MCHC RBC AUTO-ENTMCNC: 33.9 G/DL (ref 31–37)
MCV RBC AUTO: 90.9 FL
MONOCYTES # BLD AUTO: 0.61 X10(3) UL (ref 0.1–1)
MONOCYTES NFR BLD AUTO: 12.4 %
NEUTROPHILS # BLD AUTO: 3 X10 (3) UL (ref 1.5–7.7)
NEUTROPHILS # BLD AUTO: 3 X10(3) UL (ref 1.5–7.7)
NEUTROPHILS NFR BLD AUTO: 60.9 %
OSMOLALITY SERPL CALC.SUM OF ELEC: 280 MOSM/KG (ref 275–295)
P AXIS: 28 DEGREES
P-R INTERVAL: 212 MS
PLATELET # BLD AUTO: 190 10(3)UL (ref 150–450)
POTASSIUM SERPL-SCNC: 4.2 MMOL/L (ref 3.5–5.1)
PROT SERPL-MCNC: 7.3 G/DL (ref 5.7–8.2)
Q-T INTERVAL: 430 MS
QRS DURATION: 72 MS
QTC CALCULATION (BEZET): 454 MS
R AXIS: 34 DEGREES
RBC # BLD AUTO: 4.18 X10(6)UL
SODIUM SERPL-SCNC: 134 MMOL/L (ref 136–145)
T AXIS: 73 DEGREES
TROPONIN I SERPL HS-MCNC: 3 NG/L
TROPONIN I SERPL HS-MCNC: 4 NG/L
VENTRICULAR RATE: 67 BPM
WBC # BLD AUTO: 4.9 X10(3) UL (ref 4–11)

## 2025-04-04 PROCEDURE — 80053 COMPREHEN METABOLIC PANEL: CPT | Performed by: EMERGENCY MEDICINE

## 2025-04-04 PROCEDURE — 36415 COLL VENOUS BLD VENIPUNCTURE: CPT

## 2025-04-04 PROCEDURE — 84484 ASSAY OF TROPONIN QUANT: CPT

## 2025-04-04 PROCEDURE — 99285 EMERGENCY DEPT VISIT HI MDM: CPT

## 2025-04-04 PROCEDURE — 80053 COMPREHEN METABOLIC PANEL: CPT

## 2025-04-04 PROCEDURE — 93005 ELECTROCARDIOGRAM TRACING: CPT

## 2025-04-04 PROCEDURE — 85025 COMPLETE CBC W/AUTO DIFF WBC: CPT

## 2025-04-04 PROCEDURE — 84484 ASSAY OF TROPONIN QUANT: CPT | Performed by: EMERGENCY MEDICINE

## 2025-04-04 PROCEDURE — 84484 ASSAY OF TROPONIN QUANT: CPT | Performed by: STUDENT IN AN ORGANIZED HEALTH CARE EDUCATION/TRAINING PROGRAM

## 2025-04-04 PROCEDURE — 85025 COMPLETE CBC W/AUTO DIFF WBC: CPT | Performed by: EMERGENCY MEDICINE

## 2025-04-04 PROCEDURE — 71045 X-RAY EXAM CHEST 1 VIEW: CPT | Performed by: EMERGENCY MEDICINE

## 2025-04-04 PROCEDURE — 99284 EMERGENCY DEPT VISIT MOD MDM: CPT

## 2025-04-04 PROCEDURE — 93010 ELECTROCARDIOGRAM REPORT: CPT

## 2025-04-04 RX ORDER — ACETAMINOPHEN 500 MG
500 TABLET ORAL EVERY 4 HOURS PRN
Status: CANCELLED | OUTPATIENT
Start: 2025-04-04

## 2025-04-04 RX ORDER — METOPROLOL SUCCINATE 25 MG/1
100 TABLET, EXTENDED RELEASE ORAL NIGHTLY
Status: CANCELLED | OUTPATIENT
Start: 2025-04-04

## 2025-04-04 RX ORDER — ASPIRIN 325 MG
325 TABLET ORAL DAILY
Status: CANCELLED | OUTPATIENT
Start: 2025-04-04

## 2025-04-04 RX ORDER — ATORVASTATIN CALCIUM 10 MG/1
10 TABLET, FILM COATED ORAL NIGHTLY
Status: CANCELLED | OUTPATIENT
Start: 2025-04-04

## 2025-04-04 RX ORDER — ENOXAPARIN SODIUM 100 MG/ML
40 INJECTION SUBCUTANEOUS DAILY
Status: CANCELLED | OUTPATIENT
Start: 2025-04-04

## 2025-04-04 RX ORDER — ONDANSETRON 2 MG/ML
4 INJECTION INTRAMUSCULAR; INTRAVENOUS EVERY 6 HOURS PRN
Status: CANCELLED | OUTPATIENT
Start: 2025-04-04

## 2025-04-04 RX ORDER — SENNOSIDES 8.6 MG
17.2 TABLET ORAL NIGHTLY PRN
Status: CANCELLED | OUTPATIENT
Start: 2025-04-04

## 2025-04-04 RX ORDER — ASPIRIN 325 MG
325 TABLET ORAL DAILY
COMMUNITY
Start: 2025-01-29

## 2025-04-04 RX ORDER — METOCLOPRAMIDE HYDROCHLORIDE 5 MG/ML
5 INJECTION INTRAMUSCULAR; INTRAVENOUS EVERY 8 HOURS PRN
Status: CANCELLED | OUTPATIENT
Start: 2025-04-04

## 2025-04-04 RX ORDER — BISACODYL 10 MG
10 SUPPOSITORY, RECTAL RECTAL
Status: CANCELLED | OUTPATIENT
Start: 2025-04-04

## 2025-04-04 RX ORDER — SODIUM PHOSPHATE, DIBASIC AND SODIUM PHOSPHATE, MONOBASIC 7; 19 G/230ML; G/230ML
1 ENEMA RECTAL ONCE AS NEEDED
Status: CANCELLED | OUTPATIENT
Start: 2025-04-04

## 2025-04-04 RX ORDER — ASPIRIN 81 MG/1
324 TABLET, CHEWABLE ORAL ONCE
Status: COMPLETED | OUTPATIENT
Start: 2025-04-04 | End: 2025-04-04

## 2025-04-04 RX ORDER — POLYETHYLENE GLYCOL 3350 17 G/17G
17 POWDER, FOR SOLUTION ORAL DAILY PRN
Status: CANCELLED | OUTPATIENT
Start: 2025-04-04

## 2025-04-04 RX ORDER — VALSARTAN AND HYDROCHLOROTHIAZIDE 160; 12.5 MG/1; MG/1
1 TABLET, FILM COATED ORAL NIGHTLY
Status: CANCELLED | OUTPATIENT
Start: 2025-04-04

## 2025-04-04 NOTE — PROGRESS NOTES
Orders for admission, patient is aware of plan and ready to go upstairs. Any questions, please call ED RN Annia RN at extension [phone]    Patient Covid vaccination status: Unvaccinated     COVID Test Ordered in ED: None    COVID Suspicion at Admission: N/A    Running Infusions:  None    Mental Status/LOC at time of transport: A&OX4    Other pertinent information:   CIWA score: N/A   NIH score:  N/A

## 2025-04-04 NOTE — ED QUICK NOTES
Orders for admission, patient is aware of plan and ready to go upstairs. Any questions, please call ED RN Annia RN at extension [phone]    Patient Covid vaccination status: Unvaccinated     COVID Test Ordered in ED: None    COVID Suspicion at Admission: N/A    Running Infusions:  None    Mental Status/LOC at time of transport: A&Ox4    Other pertinent information:   CIWA score: N/A   NIH score:  N/A

## 2025-04-04 NOTE — ED PROVIDER NOTES
Patient Seen in: [facility] Emergency Department      History     Chief Complaint   Patient presents with    Chest Pain Angina     Stated Complaint: Chest pain    Subjective:   HPI      85-year female comes to the hospital complaint of having difficulty with chest pain.  She had it for about an hour last night as an achy sensation by the left side of her chest that finally dissipated on its own while she was in bed.  She has no history of having this in the past.  There was no radiation of discomfort.  There is no nausea, vomiting, shortness of breath or diaphoresis.  There is no radiation of her discomfort.  She denies any long travel history.  She says this morning she woke patient had later intermittent episodes of the chest pain since.  She is denying any other complaints at this time.  She does have a history of having high cholesterol and hypertension.  She has a history of having atrial fibrillation as well.    Objective:     Past Medical History:    Arrhythmia    Hearing impaired person, bilateral    High blood pressure    High cholesterol    HYPERLIPIDEMIA    HYPERTENSION    Osteoarthritis    Visual impairment              Past Surgical History:   Procedure Laterality Date    Hysterectomy      complete/excessive bleeding                Social History     Socioeconomic History    Marital status:     Number of children: 5   Occupational History    Occupation: retired     Comment: housewife   Tobacco Use    Smoking status: Never    Smokeless tobacco: Never   Vaping Use    Vaping status: Never Used   Substance and Sexual Activity    Alcohol use: No    Drug use: No    Sexual activity: Yes     Partners: Male     Social Drivers of Health     Food Insecurity: No Food Insecurity (1/1/2025)    Food Insecurity     Food Insecurity: Never true   Transportation Needs: No Transportation Needs (1/1/2025)    Transportation Needs     Lack of Transportation: No   Housing Stability: Low Risk  (1/1/2025)     Housing Stability     Housing Instability: No                  Physical Exam     ED Triage Vitals [04/04/25 1106]   BP (!) 182/71   Pulse 68   Resp 16   Temp 97.4 °F (36.3 °C)   Temp src    SpO2 93 %   O2 Device None (Room air)       Current Vitals:   Vital Signs  BP: (!) 164/62  Pulse: 63  Resp: 16  Temp: 97.4 °F (36.3 °C)  MAP (mmHg): 90    Oxygen Therapy  SpO2: 100 %  O2 Device: None (Room air)        Physical Exam  HEENT; NCAT, EOMI, throat clear, neck supple, no LAD, no JVD  Heart S1S2 RRR  lungs: CTAB  abd: Soft NT, ND,  NABS without rebound or guarding  Ext no C/C/E    ED Course     Labs Reviewed   COMP METABOLIC PANEL (14) - Abnormal; Notable for the following components:       Result Value    Sodium 134 (*)     All other components within normal limits   TROPONIN I HIGH SENSITIVITY - Normal   CBC WITH DIFFERENTIAL WITH PLATELET   RAINBOW DRAW BLUE     EKG    Rate, intervals and axes as noted on EKG Report.  Rate: 67  Rhythm: Sinus Rhythm  Reading: , QRS of 72, patient has normal sinus rhythm with first degree AV block           ED Course as of 04/04/25 1256  ------------------------------------------------------------  Time: 04/04 1240  Comment: The patient had a unremarkable CMP.  Her troponin was 3.  Her CBC was normal.  She did chest x-ray on that appears interpreted no acute cardiopulmonary process.  She did arrive here hypertensive with improvement with observation.  I reviewed the radiology report as well.  At this time patient's cardiac history and hypertension plan patient be placed in the hospital further workup.       XR CHEST AP PORTABLE  (CPT=71045)    Result Date: 4/4/2025  PROCEDURE:  XR CHEST AP PORTABLE  (CPT=71045)  TECHNIQUE:  AP chest radiograph was obtained.  COMPARISON:  [facility] , XR, XR CHEST AP/PA (1 VIEW) (CPT=71045), 1/01/2025, 6:09 PM.  INDICATIONS:  Chest pain  PATIENT STATED HISTORY: (As transcribed by Technologist)  Pt having left sided chest pain.    FINDINGS:  Lung  volumes are satisfactory, but smaller than the prior and a relatively expiratory film.  No new consolidation or pleural effusion.  Heart and pulmonary vessels appear stable, normal caliber.  Atherosclerotic changes are again seen in the thoracic aorta, similar to the prior.  No pneumothorax.              CONCLUSION:  Relatively shallow inspiration.   LOCATION:  [facility]      Dictated by (CST): Luis Carlisle MD on 4/04/2025 at 12:38 PM     Finalized by (CST): Luis Carlisle MD on 4/04/2025 at 12:39 PM               MDM      Differential diagnosis included pneumonia, pneumothorax, cardiac disease but not limited such.  At this time the patient should workup is negative but believe patient's history the patient should be placed in the hospital for further cardiac workup at this time.      This note was prepared using Dragon Medical voice recognition dictation software.  As a result errors may occur.  When identified to these areas have been corrected.  While every attempt is made to correct errors during dictation discrepancies may still exist.  Please contact if there are any errors.    Admission disposition: 4/4/2025 12:56 PM           Medical Decision Making      Disposition and Plan     Clinical Impression:  1. Acute chest pain         Disposition:  Admit  4/4/2025 12:56 pm    Follow-up:  No follow-up provider specified.        Medications Prescribed:  Current Discharge Medication List              Supplementary Documentation:         Hospital Problems       Present on Admission  Date Reviewed: 12/29/2024            ICD-10-CM Noted POA    * (Principal) Acute chest pain R07.9 4/4/2025 Unknown

## 2025-04-04 NOTE — CONSULTS
Cardiology Consultation      Susan Atkinson Patient Status:  Emergency    1940 MRN [id number]   Location [facility] EMERGENCY DEPARTMENT Attending No att. providers found   Hosp Day # 0 PCP Dana Long MD     Reason for Consultation:  Chest pain    History of Present Illness:  Susan Atkinson is a(n) 85 year old female with chronic medical conditions including htn, hld, paf who presents with episode of chest pain yesterday night after she went to bed. Described as \"just a pain\" sensation in the left side of her chest that prevented her from sleeping well as she could not find a comfortable position. Notes that discomfort was worse when laying on her left side sometimes. Eventually it lessened and she went to bed. Woke up this AM with very mild discomfort and came to the ER for further eval. No further discomfort when examined. Very active at baseline and usually does yardwork and has not been experiencing chest pain, shortness of breath, nausea, emesis, diaphoresis, palpitations, fevers, chills. Cardiology asked to evaluate.     History:  Past Medical History:    Arrhythmia    Hearing impaired person, bilateral    High blood pressure    High cholesterol    HYPERLIPIDEMIA    HYPERTENSION    Osteoarthritis    Visual impairment     Past Surgical History:   Procedure Laterality Date    Hysterectomy      complete/excessive bleeding     Family History   Problem Relation Age of Onset    Diabetes Mother       reports that she has never smoked. She has never used smokeless tobacco. She reports that she does not drink alcohol and does not use drugs.    Allergies:  Allergies[1]    Medications:  No current facility-administered medications for this encounter.    Review of Systems:  A comprehensive review of systems was negative if not otherwise mention in above HPI.    BP (!) 162/65   Pulse 65   Temp 97.4 °F (36.3 °C)   Resp 14   Ht 4' 11\" (1.499 m)   Wt 116 lb (52.6 kg)   SpO2 100%   BMI 23.43  kg/m²   Temp (24hrs), Av.4 °F (36.3 °C), Min:97.4 °F (36.3 °C), Max:97.4 °F (36.3 °C)     No intake or output data in the 24 hours ending 25 1314  Wt Readings from Last 3 Encounters:   25 116 lb (52.6 kg)   25 116 lb 6.5 oz (52.8 kg)   24 125 lb (56.7 kg)       Physical Exam:   General: Alert and oriented x 3. No apparent distress. No respiratory or constitutional distress.  HEENT: Normocephalic, anicteric sclera, neck supple.  Neck: No JVD  Cardiac: Regular rate and rhythm. S1, S2 normal. No murmur, pericardial rub, S3.  Lungs: Clear without wheezes, rales, rhonchi or dullness.  Normal excursions and effort.  Abdomen: Soft, non-tender. BS-present.  Extremities: Without clubbing, cyanosis or edema.   Neurologic: Alert and oriented, normal affect.  Skin: Warm and dry.     Laboratory Data:  Lab Results   Component Value Date    WBC 4.9 2025    HGB 12.9 2025    HCT 38.0 2025    .0 2025    CREATSERUM 0.91 2025    BUN 19 2025     2025    K 4.2 2025    CL 98 2025    CO2 30.0 2025    GLU 99 2025    CA 9.9 2025    ALB 4.6 2025    ALKPHO 90 2025    BILT 0.5 2025    TP 7.3 2025    AST 24 2025    ALT 18 2025       Imaging/results:  EKG - nsr, 1st degree av block. Nonspecific st abnormalities  Troponin 3  CXR  - negative acute cardiopulm disease       Assessment:  Chest pain  HTN  PAF  HLD      Plan:  Trend troponin - given symptom presence yesterday night, encouraging to see negative high sensitivity troponin.  Discussed with the patient that she would benefit from stress eval; however, given negative troponin and lack of symptoms of typical angina, it would not be unreasonable to do this as an outpatient.  Patient and her son would prefer outpatient eval, therefore, if 2 hour troponin is WNL and she remains symptom free, will plan for outpatient eval. Asked patient to return to  the ER if her symptoms recur.   Discussed case with ER physician        Thank you for allowing me to participate in the care of your patient.      Usman Kirkland DO  Cardiologist  [facility]  4/4/2025 1:14 PM      Note to the patient: The 21st Century Cures Act makes medical notes like these available to patients in the interest of transparency. However, be advised that this is a medical document. It is intended as peer to peer communication. It is written in medical language and may contain abbreviations or verbiage that are unfamiliar. It may appear blunt or direct. Medical documents are intended to carry relevant information, facts as evident, and clinical opinion of the practitioner.     Disclaimer: Components of this note were documented using voice recognition system and are subject to errors not corrected at proofreading. Contact the author of this note for any clarifications.          [1]   Allergies  Allergen Reactions    Penicillins HIVES    Pneumococcal Vaccines HIVES and ITCHING

## 2025-04-04 NOTE — ED QUICK NOTES
Cardiology came to bedside to evaluate pt. Per Cards, draw a repeat troponin and if negative pt can be discharged home.

## 2025-04-04 NOTE — ED QUICK NOTES
Rounding Completed    Plan of Care reviewed. Waiting for XRAY  Attending was at the bedside.  Vitals recently assessed. Son is at the bedside    Bed is locked and in lowest position, warm blanket provided for comfort. Call light within reach.

## 2025-04-04 NOTE — ED INITIAL ASSESSMENT (HPI)
Pt arrives to ED for evaluation of non-radiating CP, which started last night before bed, pt states the pain persisted, she went to sleep, and woke up this morning with CP but to a lesser degree. Pt denies n/v, SOB.

## (undated) NOTE — LETTER
September 8, 2019          Elieser Mercado [address] [facility] [address]          Dear Meli Dixon: Your results were reviewed by Dr. Margoth Barahona with the following comments:  Results reviewed; Tests show no significant abnormalities.      Plea

## (undated) NOTE — LETTER
July 8, 2018          [facility]  [address]  [facility] [address]          Dear Shamar Burns: The following are the results of your recent tests ordered by [facility]. Please review the list of test results.   Your result is the value